# Patient Record
Sex: FEMALE | Race: WHITE | NOT HISPANIC OR LATINO | Employment: OTHER | URBAN - METROPOLITAN AREA
[De-identification: names, ages, dates, MRNs, and addresses within clinical notes are randomized per-mention and may not be internally consistent; named-entity substitution may affect disease eponyms.]

---

## 2020-01-09 ENCOUNTER — HOSPITAL ENCOUNTER (EMERGENCY)
Facility: OTHER | Age: 60
Discharge: HOME | End: 2020-01-09
Attending: EMERGENCY MEDICINE
Payer: MEDICAID

## 2020-01-09 VITALS
BODY MASS INDEX: 26.36 KG/M2 | RESPIRATION RATE: 20 BRPM | DIASTOLIC BLOOD PRESSURE: 58 MMHG | HEART RATE: 87 BPM | TEMPERATURE: 98 F | WEIGHT: 178 LBS | OXYGEN SATURATION: 97 % | SYSTOLIC BLOOD PRESSURE: 106 MMHG | HEIGHT: 69 IN

## 2020-01-09 DIAGNOSIS — R05.9 COUGH: ICD-10-CM

## 2020-01-09 DIAGNOSIS — J20.9 ACUTE BRONCHITIS, UNSPECIFIED ORGANISM: Primary | ICD-10-CM

## 2020-01-09 PROCEDURE — 94640 AIRWAY INHALATION TREATMENT: CPT

## 2020-01-09 PROCEDURE — 94761 N-INVAS EAR/PLS OXIMETRY MLT: CPT

## 2020-01-09 PROCEDURE — 99285 EMERGENCY DEPT VISIT HI MDM: CPT | Mod: 25

## 2020-01-09 PROCEDURE — 25000242 PHARM REV CODE 250 ALT 637 W/ HCPCS: Performed by: EMERGENCY MEDICINE

## 2020-01-09 RX ORDER — LEVOTHYROXINE SODIUM 112 UG/1
TABLET ORAL
COMMUNITY
Start: 2020-01-04

## 2020-01-09 RX ORDER — AZITHROMYCIN 250 MG/1
TABLET, FILM COATED ORAL
Qty: 6 TABLET | Refills: 0 | Status: SHIPPED | OUTPATIENT
Start: 2020-01-09

## 2020-01-09 RX ORDER — QUETIAPINE FUMARATE 25 MG/1
25 TABLET, FILM COATED ORAL
COMMUNITY

## 2020-01-09 RX ORDER — QUETIAPINE FUMARATE 300 MG/1
300 TABLET, FILM COATED ORAL
COMMUNITY

## 2020-01-09 RX ORDER — IPRATROPIUM BROMIDE AND ALBUTEROL SULFATE 2.5; .5 MG/3ML; MG/3ML
3 SOLUTION RESPIRATORY (INHALATION)
Status: COMPLETED | OUTPATIENT
Start: 2020-01-09 | End: 2020-01-09

## 2020-01-09 RX ORDER — FLUTICASONE PROPIONATE 50 MCG
SPRAY, SUSPENSION (ML) NASAL
COMMUNITY
Start: 2020-01-03

## 2020-01-09 RX ORDER — ALBUTEROL SULFATE 90 UG/1
AEROSOL, METERED RESPIRATORY (INHALATION)
COMMUNITY
Start: 2020-01-03

## 2020-01-09 RX ORDER — TOPIRAMATE 100 MG/1
TABLET, FILM COATED ORAL
COMMUNITY
Start: 2019-06-10

## 2020-01-09 RX ADMIN — IPRATROPIUM BROMIDE AND ALBUTEROL SULFATE 3 ML: .5; 3 SOLUTION RESPIRATORY (INHALATION) at 05:01

## 2020-01-09 NOTE — ED TRIAGE NOTES
"Pt presents to ED with c/o SOB x3 days. Reports she was given an inhaler at a recent Dr visit a week ago but she was unsure how to use the inhaler so she hasn't been using it. Reports having a productive cough with occasional bloody sputum. Sates her Dr has her on a "clear liquid and soft food diet" because "I have diarrhea after every time I eat". Denies fever, chills, dysuria, chest pain, vomiting, and palpations.   "

## 2020-01-09 NOTE — ED PROVIDER NOTES
"Encounter Date: 1/9/2020    SCRIBE #1 NOTE: I, Christine Calvert, am scribing for, and in the presence of, Dr. Watson.       History     Chief Complaint   Patient presents with    Shortness of Breath     pt came to the ed  tonight c.o. shortness of breath x 3 days. pt seen at PCP given meds, and not improving     Time seen by provider: 5:20 AM    This is a 59 y.o. female who presents via EMS with complaint of intermittent SOB for two weeks. She began having a cough that worsened 1.5 weeks ago, which she states is non productive but occasionally coughs up blood. She describes a rattling sensation in her chest with inspiration. She was not given treatment en route. She was seen by her PCP about 1.5 weeks ago and he prescribed her cough medicine and an albuterol inhaler, though she has not used it. She did not have CXR and did not start antibiotics. She denies fever, chills, chest pain, or myalgias. She has no PMHx of COPD or asthma. She does not smoke. She has not been hospitalized in the last three months. Her PCP placed her on a "clear and soft" diet due to her frequent vomitting and diarrhea when eating meals.    The history is provided by the patient. The history is limited by the condition of the patient.     Review of patient's allergies indicates:  No Known Allergies  Past Medical History:   Diagnosis Date    Seizures     Thyroid disease      History reviewed. No pertinent surgical history.  History reviewed. No pertinent family history.  Social History     Tobacco Use    Smoking status: Never Smoker   Substance Use Topics    Alcohol use: Not Currently     Frequency: Never    Drug use: Never     Review of Systems   Constitutional: Negative for chills and fever.   HENT: Negative for sore throat.    Eyes: Negative for visual disturbance.   Respiratory: Positive for cough and shortness of breath.         Positive for coughing up blood.   Cardiovascular: Negative for chest pain.   Gastrointestinal: Negative for " abdominal pain.   Genitourinary: Negative for dysuria.   Musculoskeletal: Negative for myalgias.   Skin: Negative for rash.   Neurological: Negative for weakness.       Physical Exam     Initial Vitals [01/09/20 0501]   BP Pulse Resp Temp SpO2   115/73 80 (!) 21 97.9 °F (36.6 °C) 96 %      MAP       --         Physical Exam    Nursing note and vitals reviewed.  Constitutional: She appears well-developed and well-nourished. She is not diaphoretic. No distress.   HENT:   Head: Normocephalic and atraumatic.   Eyes: EOM are normal.   Neck: Normal range of motion. Neck supple.   Cardiovascular: Normal rate, regular rhythm and normal heart sounds. Exam reveals no gallop and no friction rub.    No murmur heard.  Pulmonary/Chest: No respiratory distress. She has no wheezes. She has no rhonchi. She has no rales.   Coarse breath sounds bilaterally. Speaking full sentences.   Musculoskeletal: Normal range of motion. She exhibits no edema.   Neurological: She is alert and oriented to person, place, and time.   Skin: Skin is warm and dry.         ED Course   Procedures  Labs Reviewed - No data to display       Imaging Results          X-Ray Chest PA And Lateral (Final result)  Result time 01/09/20 06:45:55    Final result by Roshan Pettit MD (01/09/20 06:45:55)                 Impression:      Mild elevation of the left hemidiaphragm with suspected small amount of pleural fluid at the left lung base, this alternatively may relate to chronic pleural change, however there is no prior study available for comparison, clinical and historical correlation and follow-up is recommended.    The lungs otherwise demonstrate chronic change and mild atelectatic change.      Electronically signed by: Roshan Pettit  Date:    01/09/2020  Time:    06:45             Narrative:    EXAMINATION:  XR CHEST PA AND LATERAL    CLINICAL HISTORY:  Cough    TECHNIQUE:  PA and lateral views of the chest were  performed.    COMPARISON:  None    FINDINGS:  Two views of the chest are submitted.  The cardiomediastinal silhouette appears appropriate.  There is mild elevation of the left hemidiaphragm and there is blunting left costophrenic angle this may relate to small amount of pleural fluid.  There is mild atelectatic change at the lung bases bilaterally and there are chronic appearing pulmonary parenchymal findings noted, there is no evidence for superimposed confluent infiltrate or consolidation and no large pleural effusion or pneumothorax.  The osseous structures demonstrate chronic change including scoliotic curvature of the spine.                                 Medical Decision Making:   History:   Old Medical Records: I decided to obtain old medical records.  Clinical Tests:   Radiological Study: Ordered and Reviewed    Additional MDM:   Comments: 59-year-old nonsmoking female with no known pulmonary disease presents complaining of a cough that has been present for over 2 weeks.  She reports it is predominantly nonproductive with occasional clear sputum and blood tinged sputum.  She reports being seen by her primary care doctor over 1 week ago and prescribed an inhaler which she has not used because she was unsure of how to use it.  Vital signs on arrival significant for tachypnea with a respiratory rate of 21 and oxygen saturation of 96%.  However, on exam she appeared in no respiratory distress.  She did have intermittent coughing throughout my exam.  Lung exam significant for coarse breath sounds but no wheezing or crackles.  Presentation most consistent with acute bronchitis versus pneumonia.  Will obtain chest x-ray, give DuoNeb and reassess.      5:59 AM  Patient reports symptomatic improvement after nebs.  Chest x-ray pending..          Scribe Attestation:   Scribe #1: I performed the above scribed service and the documentation accurately describes the services I performed. I attest to the accuracy of the  note.    Attending Attestation:           Physician Attestation for Scribe:  Physician Attestation Statement for Scribe #1: I, Dr. Watson, reviewed documentation, as scribed by Christine Calvert in my presence, and it is both accurate and complete.                               Clinical Impression:     1. Acute bronchitis, unspecified organism    2. Cough                                Che Watson MD  01/09/20 [id number]

## 2024-02-08 ENCOUNTER — TELEPHONE (OUTPATIENT)
Dept: GASTROENTEROLOGY | Facility: CLINIC | Age: 64
End: 2024-02-08
Payer: MEDICAID

## 2024-02-08 NOTE — TELEPHONE ENCOUNTER
Called patient reference to a referral to  Ambulatory Colorectal Surgery for colon cancer screening,No answer.

## 2024-02-09 ENCOUNTER — TELEPHONE (OUTPATIENT)
Dept: GASTROENTEROLOGY | Facility: CLINIC | Age: 64
End: 2024-02-09
Payer: MEDICAID

## 2024-02-09 NOTE — TELEPHONE ENCOUNTER
Called patient reference to a referral to  Ambulatory Colorectal Surgery for colon cancer screening, no answer.

## 2024-02-27 ENCOUNTER — TELEPHONE (OUTPATIENT)
Dept: SURGERY | Facility: CLINIC | Age: 64
End: 2024-02-27
Payer: MEDICAID

## 2024-02-27 NOTE — TELEPHONE ENCOUNTER
Called patient reference to a referral to  Ambulatory Colorectal Surgery for colon cancer screening. No answer not able to leave a voice message.

## 2025-03-18 ENCOUNTER — HOSPITAL ENCOUNTER (INPATIENT)
Facility: OTHER | Age: 65
LOS: 18 days | DRG: 563 | End: 2025-04-07
Attending: EMERGENCY MEDICINE | Admitting: HOSPITALIST
Payer: COMMERCIAL

## 2025-03-18 DIAGNOSIS — S82.143A TIBIAL PLATEAU FRACTURE: ICD-10-CM

## 2025-03-18 DIAGNOSIS — M54.50 ACUTE BILATERAL LOW BACK PAIN WITHOUT SCIATICA: ICD-10-CM

## 2025-03-18 DIAGNOSIS — S82.832A CLOSED FRACTURE OF PROXIMAL END OF LEFT FIBULA, UNSPECIFIED FRACTURE MORPHOLOGY, INITIAL ENCOUNTER: ICD-10-CM

## 2025-03-18 DIAGNOSIS — E27.9 LESION OF ADRENAL GLAND: ICD-10-CM

## 2025-03-18 DIAGNOSIS — S80.12XA CONTUSION OF LEFT LOWER EXTREMITY, INITIAL ENCOUNTER: ICD-10-CM

## 2025-03-18 DIAGNOSIS — M25.562 LEFT KNEE PAIN: ICD-10-CM

## 2025-03-18 DIAGNOSIS — M25.462 EFFUSION OF LEFT KNEE: ICD-10-CM
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
## 2025-03-18 DIAGNOSIS — M25.552 LEFT HIP PAIN: ICD-10-CM

## 2025-03-18 DIAGNOSIS — S82.132A CLOSED FRACTURE OF MEDIAL PORTION OF LEFT TIBIAL PLATEAU, INITIAL ENCOUNTER: ICD-10-CM

## 2025-03-18 PROCEDURE — 99285 EMERGENCY DEPT VISIT HI MDM: CPT | Mod: 25

## 2025-03-18 PROCEDURE — 25000003 PHARM REV CODE 250: Performed by: NURSE PRACTITIONER

## 2025-03-18 RX ORDER — ACETAMINOPHEN 500 MG
1000 TABLET ORAL
Status: COMPLETED | OUTPATIENT
Start: 2025-03-18 | End: 2025-03-18

## 2025-03-18 RX ADMIN — ACETAMINOPHEN 1000 MG: 500 TABLET ORAL at 07:03
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
## 2025-03-19 PROBLEM — E03.9 ACQUIRED HYPOTHYROIDISM: Status: ACTIVE | Noted: 2025-03-19

## 2025-03-19 PROBLEM — S82.143A TIBIAL PLATEAU FRACTURE: Status: ACTIVE | Noted: 2025-03-19

## 2025-03-19 PROBLEM — R56.9 SEIZURE: Status: ACTIVE | Noted: 2025-03-19

## 2025-03-19 LAB
ALBUMIN SERPL BCP-MCNC: 3.8 G/DL (ref 3.5–5.2)
ALP SERPL-CCNC: 67 U/L (ref 40–150)
ALT SERPL W/O P-5'-P-CCNC: 25 U/L (ref 10–44)
ANION GAP SERPL CALC-SCNC: 5 MMOL/L (ref 8–16)
AST SERPL-CCNC: 25 U/L (ref 10–40)
BASOPHILS # BLD AUTO: 0.02 K/UL (ref 0–0.2)
BASOPHILS NFR BLD: 0.2 % (ref 0–1.9)
BILIRUB SERPL-MCNC: 0.6 MG/DL (ref 0.1–1)
BUN SERPL-MCNC: 12 MG/DL (ref 8–23)
CALCIUM SERPL-MCNC: 8.9 MG/DL (ref 8.7–10.5)
CHLORIDE SERPL-SCNC: 109 MMOL/L (ref 95–110)
CO2 SERPL-SCNC: 24 MMOL/L (ref 23–29)
CREAT SERPL-MCNC: 0.8 MG/DL (ref 0.5–1.4)
DIFFERENTIAL METHOD BLD: ABNORMAL
EOSINOPHIL # BLD AUTO: 0 K/UL (ref 0–0.5)
EOSINOPHIL NFR BLD: 0 % (ref 0–8)
ERYTHROCYTE [DISTWIDTH] IN BLOOD BY AUTOMATED COUNT: 13.9 % (ref 11.5–14.5)
EST. GFR  (NO RACE VARIABLE): >60 ML/MIN/1.73 M^2
ESTIMATED AVG GLUCOSE: 103 MG/DL (ref 68–131)
GLUCOSE SERPL-MCNC: 122 MG/DL (ref 70–110)
HBA1C MFR BLD: 5.2 % (ref 4–5.6)
HCT VFR BLD AUTO: 37.8 % (ref 37–48.5)
HGB BLD-MCNC: 12.5 G/DL (ref 12–16)
IMM GRANULOCYTES # BLD AUTO: 0.04 K/UL (ref 0–0.04)
IMM GRANULOCYTES NFR BLD AUTO: 0.3 % (ref 0–0.5)
LYMPHOCYTES # BLD AUTO: 0.8 K/UL (ref 1–4.8)
LYMPHOCYTES NFR BLD: 6.5 % (ref 18–48)
MAGNESIUM SERPL-MCNC: 2.1 MG/DL (ref 1.6–2.6)
MCH RBC QN AUTO: 30.2 PG (ref 27–31)
MCHC RBC AUTO-ENTMCNC: 33.1 G/DL (ref 32–36)
MCV RBC AUTO: 91 FL (ref 82–98)
MONOCYTES # BLD AUTO: 0.5 K/UL (ref 0.3–1)
MONOCYTES NFR BLD: 3.9 % (ref 4–15)
NEUTROPHILS # BLD AUTO: 10.3 K/UL (ref 1.8–7.7)
NEUTROPHILS NFR BLD: 89.1 % (ref 38–73)
NRBC BLD-RTO: 0 /100 WBC
PHOSPHATE SERPL-MCNC: 2.9 MG/DL (ref 2.7–4.5)
PLATELET # BLD AUTO: 214 K/UL (ref 150–450)
PMV BLD AUTO: 8.9 FL (ref 9.2–12.9)
POTASSIUM SERPL-SCNC: 4.2 MMOL/L (ref 3.5–5.1)
PROT SERPL-MCNC: 6.8 G/DL (ref 6–8.4)
RBC # BLD AUTO: 4.14 M/UL (ref 4–5.4)
SODIUM SERPL-SCNC: 138 MMOL/L (ref 136–145)
WBC # BLD AUTO: 11.56 K/UL (ref 3.9–12.7)

## 2025-03-19 PROCEDURE — 36415 COLL VENOUS BLD VENIPUNCTURE: CPT | Performed by: NURSE PRACTITIONER

## 2025-03-19 PROCEDURE — 25000003 PHARM REV CODE 250: Performed by: NURSE PRACTITIONER

## 2025-03-19 PROCEDURE — 83735 ASSAY OF MAGNESIUM: CPT | Performed by: NURSE PRACTITIONER

## 2025-03-19 PROCEDURE — 84100 ASSAY OF PHOSPHORUS: CPT | Performed by: NURSE PRACTITIONER

## 2025-03-19 PROCEDURE — 85025 COMPLETE CBC W/AUTO DIFF WBC: CPT | Performed by: NURSE PRACTITIONER

## 2025-03-19 PROCEDURE — 83036 HEMOGLOBIN GLYCOSYLATED A1C: CPT | Performed by: NURSE PRACTITIONER

## 2025-03-19 PROCEDURE — 63600175 PHARM REV CODE 636 W HCPCS: Performed by: NURSE PRACTITIONER

## 2025-03-19 PROCEDURE — 25000003 PHARM REV CODE 250: Performed by: EMERGENCY MEDICINE

## 2025-03-19 PROCEDURE — 80053 COMPREHEN METABOLIC PANEL: CPT | Performed by: NURSE PRACTITIONER

## 2025-03-19 PROCEDURE — G0378 HOSPITAL OBSERVATION PER HR: HCPCS

## 2025-03-19 RX ORDER — LEVOTHYROXINE SODIUM 112 UG/1
112 TABLET ORAL
Status: DISCONTINUED | OUTPATIENT
Start: 2025-03-19 | End: 2025-04-07 | Stop reason: HOSPADM

## 2025-03-19 RX ORDER — ATORVASTATIN CALCIUM 20 MG/1
40 TABLET, FILM COATED ORAL DAILY
Status: DISCONTINUED | OUTPATIENT
Start: 2025-03-19 | End: 2025-04-07 | Stop reason: HOSPADM

## 2025-03-19 RX ORDER — OXYCODONE HYDROCHLORIDE 5 MG/1
5 TABLET ORAL
Refills: 0 | Status: COMPLETED | OUTPATIENT
Start: 2025-03-19 | End: 2025-03-19

## 2025-03-19 RX ORDER — GLUCAGON 1 MG
1 KIT INJECTION
Status: DISCONTINUED | OUTPATIENT
Start: 2025-03-19 | End: 2025-04-07 | Stop reason: HOSPADM

## 2025-03-19 RX ORDER — SODIUM CHLORIDE, SODIUM LACTATE, POTASSIUM CHLORIDE, CALCIUM CHLORIDE 600; 310; 30; 20 MG/100ML; MG/100ML; MG/100ML; MG/100ML
INJECTION, SOLUTION INTRAVENOUS CONTINUOUS
Status: DISCONTINUED | OUTPATIENT
Start: 2025-03-19 | End: 2025-03-19

## 2025-03-19 RX ORDER — ENOXAPARIN SODIUM 100 MG/ML
40 INJECTION SUBCUTANEOUS EVERY 24 HOURS
Status: DISCONTINUED | OUTPATIENT
Start: 2025-03-20 | End: 2025-04-07 | Stop reason: HOSPADM

## 2025-03-19 RX ORDER — IBUPROFEN 200 MG
16 TABLET ORAL
Status: DISCONTINUED | OUTPATIENT
Start: 2025-03-19 | End: 2025-04-07 | Stop reason: HOSPADM

## 2025-03-19 RX ORDER — BENZONATATE 100 MG/1
100 CAPSULE ORAL ONCE
Status: COMPLETED | OUTPATIENT
Start: 2025-03-19 | End: 2025-03-19

## 2025-03-19 RX ORDER — POLYETHYLENE GLYCOL 3350 17 G/17G
17 POWDER, FOR SOLUTION ORAL DAILY
Status: DISCONTINUED | OUTPATIENT
Start: 2025-03-20 | End: 2025-04-07 | Stop reason: HOSPADM

## 2025-03-19 RX ORDER — SODIUM CHLORIDE 0.9 % (FLUSH) 0.9 %
10 SYRINGE (ML) INJECTION EVERY 8 HOURS PRN
Status: DISCONTINUED | OUTPATIENT
Start: 2025-03-19 | End: 2025-04-07 | Stop reason: HOSPADM

## 2025-03-19 RX ORDER — ONDANSETRON HYDROCHLORIDE 2 MG/ML
4 INJECTION, SOLUTION INTRAVENOUS EVERY 8 HOURS PRN
Status: DISCONTINUED | OUTPATIENT
Start: 2025-03-19 | End: 2025-04-06

## 2025-03-19 RX ORDER — ACETAMINOPHEN 325 MG/1
650 TABLET ORAL EVERY 4 HOURS PRN
Status: DISCONTINUED | OUTPATIENT
Start: 2025-03-19 | End: 2025-03-27

## 2025-03-19 RX ORDER — MORPHINE SULFATE 2 MG/ML
2 INJECTION, SOLUTION INTRAMUSCULAR; INTRAVENOUS EVERY 4 HOURS PRN
Refills: 0 | Status: DISCONTINUED | OUTPATIENT
Start: 2025-03-19 | End: 2025-03-20

## 2025-03-19 RX ORDER — NALOXONE HCL 0.4 MG/ML
0.02 VIAL (ML) INJECTION
Status: DISCONTINUED | OUTPATIENT
Start: 2025-03-19 | End: 2025-04-07 | Stop reason: HOSPADM

## 2025-03-19 RX ORDER — LAMOTRIGINE 100 MG/1
200 TABLET ORAL DAILY
Status: DISCONTINUED | OUTPATIENT
Start: 2025-03-19 | End: 2025-04-07 | Stop reason: HOSPADM

## 2025-03-19 RX ORDER — QUETIAPINE FUMARATE 200 MG/1
200 TABLET, FILM COATED ORAL NIGHTLY
COMMUNITY

## 2025-03-19 RX ORDER — ATORVASTATIN CALCIUM 40 MG/1
40 TABLET, FILM COATED ORAL NIGHTLY
Status: ON HOLD | COMMUNITY
End: 2025-04-02

## 2025-03-19 RX ORDER — IBUPROFEN 200 MG
24 TABLET ORAL
Status: DISCONTINUED | OUTPATIENT
Start: 2025-03-19 | End: 2025-04-07 | Stop reason: HOSPADM

## 2025-03-19 RX ADMIN — ACETAMINOPHEN 650 MG: 325 TABLET, FILM COATED ORAL at 05:03

## 2025-03-19 RX ADMIN — SODIUM CHLORIDE, POTASSIUM CHLORIDE, SODIUM LACTATE AND CALCIUM CHLORIDE: 600; 310; 30; 20 INJECTION, SOLUTION INTRAVENOUS at 05:03

## 2025-03-19 RX ADMIN — ACETAMINOPHEN 650 MG: 325 TABLET, FILM COATED ORAL at 01:03

## 2025-03-19 RX ADMIN — BENZONATATE 100 MG: 100 CAPSULE ORAL at 11:03

## 2025-03-19 RX ADMIN — MORPHINE SULFATE 2 MG: 2 INJECTION, SOLUTION INTRAMUSCULAR; INTRAVENOUS at 07:03

## 2025-03-19 RX ADMIN — OXYCODONE HYDROCHLORIDE 5 MG: 5 TABLET ORAL at 01:03

## 2025-03-19 RX ADMIN — LEVOTHYROXINE SODIUM 112 MCG: 112 TABLET ORAL at 05:03

## 2025-03-19 RX ADMIN — MORPHINE SULFATE 2 MG: 2 INJECTION, SOLUTION INTRAMUSCULAR; INTRAVENOUS at 09:03

## 2025-03-19 RX ADMIN — ACETAMINOPHEN 650 MG: 325 TABLET, FILM COATED ORAL at 11:03

## 2025-03-19 RX ADMIN — LAMOTRIGINE 200 MG: 100 TABLET ORAL at 08:03

## 2025-03-19 RX ADMIN — ATORVASTATIN CALCIUM 40 MG: 20 TABLET, FILM COATED ORAL at 08:03
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
## 2025-03-19 NOTE — PT/OT/SLP PROGRESS
Physical Therapy  Not Seen    Patient Name:  Anika Barajas   MRN:  [id number]    Patient not seen today secondary to Other (Comment), Bedrest (awaiting clarification on ROM restrictions from PA) and on bedrest until later in PM. Will follow-up tomorrow, 3/20.

## 2025-03-19 NOTE — ASSESSMENT & PLAN NOTE
CT- Acute depressed fracture of the medial tibial plateau. Subtle nondisplaced fracture of the proximal fibula.    Consult Ortho  Bedrest  Morphine for pain
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
## 2025-03-19 NOTE — PLAN OF CARE
Plan of care reviewed with patient. Pain managed with PRN pain medications. Patient expressing concerns with working with PT/OT. Patient sister Esther requesting case management call her tomorrow and speak with her at [phone] [facility] notified. Patient noted with a regular diet as Ortho reporting no surgical intervention needed at this time. Will note any changes as they occur.       Problem: Adult Inpatient Plan of Care  Goal: Plan of Care Review  Outcome: Progressing  Goal: Patient-Specific Goal (Individualized)  Outcome: Progressing  Goal: Absence of Hospital-Acquired Illness or Injury  Outcome: Progressing  Goal: Optimal Comfort and Wellbeing  Outcome: Progressing  Goal: Readiness for Transition of Care  Outcome: Progressing     Problem: Fall Injury Risk  Goal: Absence of Fall and Fall-Related Injury  Outcome: Progressing     Problem: Skin Injury Risk Increased  Goal: Skin Health and Integrity  Outcome: Progressing
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
## 2025-03-19 NOTE — SUBJECTIVE & OBJECTIVE
Past Medical History:   Diagnosis Date    Seizures     Thyroid disease        History reviewed. No pertinent surgical history.    Review of patient's allergies indicates:  No Known Allergies    No current facility-administered medications on file prior to encounter.     Current Outpatient Medications on File Prior to Encounter   Medication Sig    albuterol (PROVENTIL/VENTOLIN HFA) 90 mcg/actuation inhaler     azithromycin (Z-ALMA) 250 MG tablet Take first 2 tablets together, then 1 every day until finished.    fluticasone propionate (FLONASE) 50 mcg/actuation nasal spray     levothyroxine (SYNTHROID) 112 MCG tablet     QUEtiapine (SEROQUEL) 25 MG Tab Take 25 mg by mouth.    QUEtiapine (SEROQUEL) 300 MG Tab Take 300 mg by mouth.    topiramate (TOPAMAX) 100 MG tablet      Family History    None       Tobacco Use    Smoking status: Never    Smokeless tobacco: Not on file   Substance and Sexual Activity    Alcohol use: Not Currently    Drug use: Never    Sexual activity: Not on file     Review of Systems   Constitutional:  Negative for activity change, appetite change and fever.   HENT:  Negative for congestion, ear pain, rhinorrhea and sinus pressure.    Eyes:  Negative for pain and discharge.   Respiratory:  Negative for cough, chest tightness, shortness of breath and wheezing.    Cardiovascular:  Negative for chest pain and leg swelling.   Gastrointestinal:  Negative for abdominal distention, abdominal pain, diarrhea, nausea and vomiting.   Endocrine: Negative for cold intolerance and heat intolerance.   Genitourinary:  Negative for difficulty urinating, flank pain, frequency, hematuria and urgency.   Musculoskeletal:  Positive for arthralgias, joint swelling (left knee) and myalgias.   Allergic/Immunologic: Negative for environmental allergies and food allergies.   Neurological:  Negative for dizziness, weakness, light-headedness and headaches.   Hematological:  Does not bruise/bleed easily.   Psychiatric/Behavioral:   Negative for agitation, behavioral problems and decreased concentration.      Objective:     Vital Signs (Most Recent):  Temp: 97.1 °F (36.2 °C) (03/19/25 0511)  Pulse: 64 (03/19/25 0511)  Resp: 16 (03/19/25 0511)  BP: 130/75 (03/19/25 0511)  SpO2: (!) 93 % (03/19/25 0511) Vital Signs (24h Range):  Temp:  [97.1 °F (36.2 °C)-98.9 °F (37.2 °C)] 97.1 °F (36.2 °C)  Pulse:  [60-76] 64  Resp:  [16-20] 16  SpO2:  [93 %-100 %] 93 %  BP: (124-132)/(68-78) 130/75        There is no height or weight on file to calculate BMI.     Physical Exam  Constitutional:       Appearance: Normal appearance. She is well-developed.   HENT:      Head: Normocephalic.   Eyes:      General:         Right eye: No discharge.         Left eye: No discharge.      Conjunctiva/sclera: Conjunctivae normal.   Cardiovascular:      Rate and Rhythm: Normal rate and regular rhythm.      Heart sounds: Normal heart sounds.   Pulmonary:      Effort: Pulmonary effort is normal. No respiratory distress.      Breath sounds: Normal breath sounds.   Abdominal:      General: Bowel sounds are normal. There is no distension.      Palpations: Abdomen is soft.      Tenderness: There is no abdominal tenderness.   Musculoskeletal:      Cervical back: Normal range of motion and neck supple.      Left knee: Swelling present. Decreased range of motion. Tenderness present.   Skin:     General: Skin is warm and dry.   Neurological:      Mental Status: She is alert and oriented to person, place, and time.      GCS: GCS eye subscore is 4. GCS verbal subscore is 5. GCS motor subscore is 6.      Motor: Motor function is intact.   Psychiatric:         Mood and Affect: Mood normal.         Speech: Speech normal.         Behavior: Behavior normal.         Thought Content: Thought content normal.                Significant Labs: All pertinent labs within the past 24 hours have been reviewed.  CBC:   Recent Labs   Lab 03/19/25  0436   WBC 11.56   HGB 12.5   HCT 37.8         CMP:   Recent Labs   Lab 03/19/25  0436      K 4.2      CO2 24   *   BUN 12   CREATININE 0.8   CALCIUM 8.9   PROT 6.8   ALBUMIN 3.8   BILITOT 0.6   ALKPHOS 67   AST 25   ALT 25   ANIONGAP 5*       Significant Imaging: I have reviewed all pertinent imaging results/findings within the past 24 hours.
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
## 2025-03-19 NOTE — HOSPITAL COURSE
CT LEFT KNEE noted Acute depressed fracture of the medial tibial plateau; subtle nondisplaced fracture of the proximal fibula.  Orthopedics consulted for evaluation - recommends nonoperative treatment at this time with toe touch weight bearing and a hinged knee brace for at least 4 weeks, then repeat orthopedic evaluation. PT/OT rec SNF. CM aware. Delayed due to insurance issues.  Patient finally accepted to [facility] for a 30 day stay on 4/7/2025.  She was seen and examined on the day of discharge.

## 2025-03-19 NOTE — PLAN OF CARE
MOON Message    Medicare Outpatient and Observation Notification regarding financial responsibilityExplained to patient/caregiver; Signed/date by patient/caregiverDate GOMEZ - Patient gave Verbal Consent

## 2025-03-19 NOTE — PLAN OF CARE
Pt remained free of falls and injuries. AAOx4. Pt very anxious about being in the hospital but polite, talkative, and cooperative. Purposeful hourly rounding performed. Pt swallows meds whole. LR infusing @ 50 mL/hr. Managed c/o L knee pain with PRN Tylenol. Pt educated on bed rest/ NWB to LLE, verbalized understanding. LLE immobilizer maintained. Pure wick in place to suction. VSS on RA. Pt sitting up in bed watching tv, NADN. Bed low and locked, bed alarm on, call light in reach. Side rails up x3. Report given to KYLE Sherwood.
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
## 2025-03-19 NOTE — PLAN OF CARE
NONA/CM contacted the patient's sister, Esther, to discuss her concerns regarding her sisters discharge and the plan for care upon returning home. Esther expressed concerns about the patient living alone, if applicable, e.g., patients ability to manage care independently, need for home assistance.       [phone] -- Esther Curry

## 2025-03-19 NOTE — ASSESSMENT & PLAN NOTE
CT- Acute depressed fracture of the medial tibial plateau. Subtle nondisplaced fracture of the proximal fibula.    Consult Ortho  Bedrest  Morphine for pain  IVF
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
## 2025-03-19 NOTE — PROVIDER PROGRESS NOTES - EMERGENCY DEPT.
Encounter Date: 3/18/2025    ED Physician Progress Notes        Physician Note:   2:11 AM  I assumed care of this patient from Dr. Henry at 1:00 a.m..  At that time the patient was awaiting the results of her left knee CT.    CT is consistent with a depressed fracture of the medial tibial plateau as well as a subtle nondisplaced fracture of the proximal fibula.    I have discussed the case with Dr. Jj, orthopedics on-call who recommends knee immobilizer and nonweightbearing.    The patient will be admitted to the hospitalist service for pain control and orthopedic evaluation.

## 2025-03-20 LAB
ALBUMIN SERPL BCP-MCNC: 3.1 G/DL (ref 3.5–5.2)
ALP SERPL-CCNC: 62 U/L (ref 40–150)
ALT SERPL W/O P-5'-P-CCNC: 18 U/L (ref 10–44)
ANION GAP SERPL CALC-SCNC: 5 MMOL/L (ref 8–16)
AST SERPL-CCNC: 20 U/L (ref 10–40)
BASOPHILS # BLD AUTO: 0.02 K/UL (ref 0–0.2)
BASOPHILS NFR BLD: 0.2 % (ref 0–1.9)
BILIRUB SERPL-MCNC: 0.4 MG/DL (ref 0.1–1)
BUN SERPL-MCNC: 10 MG/DL (ref 8–23)
CALCIUM SERPL-MCNC: 8.1 MG/DL (ref 8.7–10.5)
CHLORIDE SERPL-SCNC: 107 MMOL/L (ref 95–110)
CO2 SERPL-SCNC: 23 MMOL/L (ref 23–29)
CREAT SERPL-MCNC: 0.7 MG/DL (ref 0.5–1.4)
DIFFERENTIAL METHOD BLD: ABNORMAL
EOSINOPHIL # BLD AUTO: 0 K/UL (ref 0–0.5)
EOSINOPHIL NFR BLD: 0 % (ref 0–8)
ERYTHROCYTE [DISTWIDTH] IN BLOOD BY AUTOMATED COUNT: 14 % (ref 11.5–14.5)
EST. GFR  (NO RACE VARIABLE): >60 ML/MIN/1.73 M^2
GLUCOSE SERPL-MCNC: 102 MG/DL (ref 70–110)
HCT VFR BLD AUTO: 34.6 % (ref 37–48.5)
HGB BLD-MCNC: 11.2 G/DL (ref 12–16)
IMM GRANULOCYTES # BLD AUTO: 0.04 K/UL (ref 0–0.04)
IMM GRANULOCYTES NFR BLD AUTO: 0.4 % (ref 0–0.5)
LYMPHOCYTES # BLD AUTO: 1.3 K/UL (ref 1–4.8)
LYMPHOCYTES NFR BLD: 13.9 % (ref 18–48)
MAGNESIUM SERPL-MCNC: 2.1 MG/DL (ref 1.6–2.6)
MCH RBC QN AUTO: 29.9 PG (ref 27–31)
MCHC RBC AUTO-ENTMCNC: 32.4 G/DL (ref 32–36)
MCV RBC AUTO: 92 FL (ref 82–98)
MONOCYTES # BLD AUTO: 0.6 K/UL (ref 0.3–1)
MONOCYTES NFR BLD: 6.1 % (ref 4–15)
NEUTROPHILS # BLD AUTO: 7.4 K/UL (ref 1.8–7.7)
NEUTROPHILS NFR BLD: 79.4 % (ref 38–73)
NRBC BLD-RTO: 0 /100 WBC
PHOSPHATE SERPL-MCNC: 2.7 MG/DL (ref 2.7–4.5)
PLATELET # BLD AUTO: 187 K/UL (ref 150–450)
PMV BLD AUTO: 10 FL (ref 9.2–12.9)
POTASSIUM SERPL-SCNC: 4.1 MMOL/L (ref 3.5–5.1)
PROT SERPL-MCNC: 6.3 G/DL (ref 6–8.4)
RBC # BLD AUTO: 3.75 M/UL (ref 4–5.4)
SODIUM SERPL-SCNC: 135 MMOL/L (ref 136–145)
WBC # BLD AUTO: 9.33 K/UL (ref 3.9–12.7)

## 2025-03-20 PROCEDURE — 11000001 HC ACUTE MED/SURG PRIVATE ROOM

## 2025-03-20 PROCEDURE — 80053 COMPREHEN METABOLIC PANEL: CPT | Performed by: NURSE PRACTITIONER

## 2025-03-20 PROCEDURE — 36415 COLL VENOUS BLD VENIPUNCTURE: CPT | Performed by: NURSE PRACTITIONER

## 2025-03-20 PROCEDURE — 63600175 PHARM REV CODE 636 W HCPCS: Performed by: NURSE PRACTITIONER

## 2025-03-20 PROCEDURE — 63600175 PHARM REV CODE 636 W HCPCS: Performed by: HOSPITALIST

## 2025-03-20 PROCEDURE — 25000003 PHARM REV CODE 250: Performed by: HOSPITALIST

## 2025-03-20 PROCEDURE — 83735 ASSAY OF MAGNESIUM: CPT | Performed by: NURSE PRACTITIONER

## 2025-03-20 PROCEDURE — 25000003 PHARM REV CODE 250: Performed by: NURSE PRACTITIONER

## 2025-03-20 PROCEDURE — 85025 COMPLETE CBC W/AUTO DIFF WBC: CPT | Performed by: NURSE PRACTITIONER

## 2025-03-20 PROCEDURE — 84100 ASSAY OF PHOSPHORUS: CPT | Performed by: NURSE PRACTITIONER

## 2025-03-20 RX ORDER — QUETIAPINE FUMARATE 25 MG/1
25 TABLET, FILM COATED ORAL DAILY
Status: DISCONTINUED | OUTPATIENT
Start: 2025-03-20 | End: 2025-04-07 | Stop reason: HOSPADM

## 2025-03-20 RX ORDER — OXYCODONE AND ACETAMINOPHEN 7.5; 325 MG/1; MG/1
1 TABLET ORAL EVERY 4 HOURS PRN
Refills: 0 | Status: DISCONTINUED | OUTPATIENT
Start: 2025-03-20 | End: 2025-03-27

## 2025-03-20 RX ORDER — OXYCODONE HYDROCHLORIDE 5 MG/1
5 TABLET ORAL EVERY 6 HOURS PRN
Refills: 0 | Status: DISCONTINUED | OUTPATIENT
Start: 2025-03-20 | End: 2025-03-20

## 2025-03-20 RX ORDER — AMOXICILLIN 250 MG
1 CAPSULE ORAL NIGHTLY
Status: COMPLETED | OUTPATIENT
Start: 2025-03-20 | End: 2025-03-20

## 2025-03-20 RX ORDER — OXYCODONE AND ACETAMINOPHEN 5; 325 MG/1; MG/1
1 TABLET ORAL EVERY 6 HOURS PRN
Refills: 0 | Status: DISCONTINUED | OUTPATIENT
Start: 2025-03-20 | End: 2025-03-27

## 2025-03-20 RX ORDER — TALC
6 POWDER (GRAM) TOPICAL NIGHTLY PRN
Status: DISCONTINUED | OUTPATIENT
Start: 2025-03-20 | End: 2025-04-07 | Stop reason: HOSPADM

## 2025-03-20 RX ORDER — QUETIAPINE FUMARATE 200 MG/1
200 TABLET, FILM COATED ORAL NIGHTLY
Status: DISCONTINUED | OUTPATIENT
Start: 2025-03-20 | End: 2025-04-07 | Stop reason: HOSPADM

## 2025-03-20 RX ORDER — AMOXICILLIN 250 MG
2 CAPSULE ORAL DAILY PRN
Status: DISCONTINUED | OUTPATIENT
Start: 2025-03-20 | End: 2025-03-27

## 2025-03-20 RX ADMIN — MORPHINE SULFATE 2 MG: 2 INJECTION, SOLUTION INTRAMUSCULAR; INTRAVENOUS at 09:03

## 2025-03-20 RX ADMIN — QUETIAPINE FUMARATE 25 MG: 25 TABLET ORAL at 08:03

## 2025-03-20 RX ADMIN — LAMOTRIGINE 200 MG: 100 TABLET ORAL at 08:03

## 2025-03-20 RX ADMIN — OXYCODONE HYDROCHLORIDE AND ACETAMINOPHEN 1 TABLET: 5; 325 TABLET ORAL at 08:03

## 2025-03-20 RX ADMIN — MORPHINE SULFATE 2 MG: 2 INJECTION, SOLUTION INTRAMUSCULAR; INTRAVENOUS at 03:03

## 2025-03-20 RX ADMIN — SENNOSIDES AND DOCUSATE SODIUM 1 TABLET: 50; 8.6 TABLET ORAL at 10:03

## 2025-03-20 RX ADMIN — ENOXAPARIN SODIUM 40 MG: 40 INJECTION SUBCUTANEOUS at 04:03

## 2025-03-20 RX ADMIN — QUETIAPINE FUMARATE 200 MG: 200 TABLET ORAL at 10:03

## 2025-03-20 RX ADMIN — POLYETHYLENE GLYCOL 3350 17 G: 17 POWDER, FOR SOLUTION ORAL at 08:03

## 2025-03-20 RX ADMIN — LEVOTHYROXINE SODIUM 112 MCG: 112 TABLET ORAL at 06:03

## 2025-03-20 RX ADMIN — ACETAMINOPHEN 650 MG: 325 TABLET, FILM COATED ORAL at 02:03

## 2025-03-20 RX ADMIN — ONDANSETRON 4 MG: 2 INJECTION INTRAMUSCULAR; INTRAVENOUS at 09:03

## 2025-03-20 RX ADMIN — ATORVASTATIN CALCIUM 40 MG: 20 TABLET, FILM COATED ORAL at 08:03

## 2025-03-20 RX ADMIN — MORPHINE SULFATE 2 MG: 2 INJECTION, SOLUTION INTRAMUSCULAR; INTRAVENOUS at 02:03

## 2025-03-20 NOTE — PT/OT/SLP PROGRESS
Physical Therapy  Not Seen    Patient Name:  Anika Barajas   MRN:  [id number]    Patient not seen today secondary to Therapist assessment (no hinged brace). Discussed with CM via secure chat, and the hinged knee brace will be delivered after rehab hours. Will follow-up tomorrow, 3/21.    Extended discussion with pt about rehab plans and gradual mobility - pt with increased anxious affect. At beside total of 30 min (13 min in AM, 17 min at PM). DF strength intact 5/5, deferred any mobility 2/2 lack of brace that was ordered by ortho PA Wednesday 3/19.      Living Environment:  Pt lives alone in an elevator accessible apartment.  Upon discharge, patient will have assistance from maybe family, but not available 24/7.  Prior level of function:  Ambulation: mod(I) with rollator, reports having used a rollator for all mobility for years  ADL's: mod(I)

## 2025-03-20 NOTE — PT/OT/SLP PROGRESS
Physical Therapy  Not Seen    Patient Name:  Anika Barajas   MRN:  [id number]    Patient not seen today secondary to Therapist assessment, Patient ill (Comment) (vomiting after IV morphine, hinged knee brace not at bedside). Had initiated PT evaluation but upon minimal adjustment of bed pt with onset of nausea with vomiting and discovered incorrect brace at that time. Will follow-up when brace delivered.

## 2025-03-20 NOTE — PLAN OF CARE
Pt remained free of falls and injuries. AAOx4. Pt very anxious and tearful at times, cooperative. Purposeful hourly rounding performed. Pt swallows meds whole. IV flushed and saline locked. Managed c/o L knee pain with PRN Tylenol. LLE immobilizer maintained. Pure wick in place to suction. Frequent weight shifting encouraged, weight shift assistance provided. VSS on RA. Pt resting in bed, denies needs at this time, NADN. Bed low and locked, bed alarm on, call light in reach. Side rails up x3. Will continue to monitor.
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
## 2025-03-20 NOTE — SUBJECTIVE & OBJECTIVE
Interval History:  States has pain to fracture site.        Review of Systems   Constitutional:  Negative for appetite change and fever.   Respiratory:  Negative for cough and shortness of breath.    Cardiovascular:  Negative for chest pain and palpitations.   Gastrointestinal:  Negative for abdominal pain and nausea.   Musculoskeletal:  Positive for gait problem.   Skin:  Negative for color change and rash.   Neurological:  Negative for headaches.   Psychiatric/Behavioral:  Negative for agitation.      Objective:     Vital Signs (Most Recent):  Temp: 98 °F (36.7 °C) (03/20/25 1517)  Pulse: 60 (03/20/25 1517)  Resp: 17 (03/20/25 1517)  BP: 112/67 (03/20/25 1517)  SpO2: (!) 92 % (03/20/25 1517) Vital Signs (24h Range):  Temp:  [97.8 °F (36.6 °C)-98.6 °F (37 °C)] 98 °F (36.7 °C)  Pulse:  [56-70] 60  Resp:  [12-18] 17  SpO2:  [90 %-94 %] 92 %  BP: ()/(51-67) 112/67     Weight: 84.6 kg (186 lb 8.2 oz)  Body mass index is 27.54 kg/m².    Intake/Output Summary (Last 24 hours) at 3/20/2025 1623  Last data filed at 3/20/2025 0611  Gross per 24 hour   Intake 1352.65 ml   Output 850 ml   Net 502.65 ml         Physical Exam  Constitutional:       General: She is not in acute distress.     Appearance: Normal appearance. She is well-developed.   HENT:      Head: Normocephalic and atraumatic.   Eyes:      Conjunctiva/sclera: Conjunctivae normal.      Pupils: Pupils are equal, round, and reactive to light.   Cardiovascular:      Rate and Rhythm: Normal rate and regular rhythm.   Pulmonary:      Effort: Pulmonary effort is normal.      Breath sounds: Normal breath sounds.   Abdominal:      General: Bowel sounds are normal.      Palpations: Abdomen is soft.      Tenderness: There is no abdominal tenderness.   Musculoskeletal:      Cervical back: Normal range of motion.      Comments: Brace to LLE   Skin:     General: Skin is warm and dry.   Neurological:      Mental Status: She is alert and oriented to person, place, and time.  "            Significant Labs: All pertinent labs within the past 24 hours have been reviewed.  CBC:   Recent Labs   Lab 03/19/25 0436 03/20/25  0253   WBC 11.56 9.33   HGB 12.5 11.2*   HCT 37.8 34.6*    187     CMP:   Recent Labs   Lab 03/19/25 0436 03/20/25  0253    135*   K 4.2 4.1    107   CO2 24 23   * 102   BUN 12 10   CREATININE 0.8 0.7   CALCIUM 8.9 8.1*   PROT 6.8 6.3   ALBUMIN 3.8 3.1*   BILITOT 0.6 0.4   ALKPHOS 67 62   AST 25 20   ALT 25 18   ANIONGAP 5* 5*     Coagulation: No results for input(s): "PT", "INR", "APTT" in the last 48 hours.  Magnesium:   Recent Labs   Lab 03/19/25 0436 03/20/25 0253   MG 2.1 2.1       Significant Imaging: I have reviewed all pertinent imaging results/findings within the past 24 hours.    Imaging Results               CT Knee Without Contrast Left (Final result)  Result time 03/19/25 01:54:39      Final result by Roshan Pettit MD (03/19/25 01:54:39)                   Impression:      Acute depressed fracture of the medial tibial plateau.    Subtle nondisplaced fracture of the proximal fibula.    Additional findings as above.    This report was flagged in Epic as abnormal.      Electronically signed by: Roshan Pettit  Date:    03/19/2025  Time:    01:54               Narrative:    EXAMINATION:  CT KNEE WITHOUT CONTRAST LEFT    CLINICAL HISTORY:  Knee trauma, tibial plateau fracture (Age >= 5y);Knee trauma, occult fracture suspected, xray done;    TECHNIQUE:  CT examination of the left knee was performed, axial imaging, sagittal and coronal reconstruction imaging is submitted.    COMPARISON:  Radiograph left knee March 18, 2025    FINDINGS:  There is acute fracture deformity involving the proximal left tibia involving the medial tibia plateau, there is mildly comminuted depressed fracture deformity of the medial tibial plateau.    There is also subtle nondisplaced fracture involving the proximal left fibula.    Small osseous density " adjacent to the medial aspect of the patella appears chronic and may relate to remote injury.  There is no evidence for patellofemoral or femoral tibial dislocation.    There is appearance consistent with hemarthrosis.    The osseous structures otherwise demonstrate chronic change.  There is no CT detectable radiopaque foreign body.                                       X-Ray Hip 2 or 3 views Left with Pelvis when performed (Final result)  Result time 03/18/25 23:59:15      Final result by David Rivas MD (03/18/25 23:59:15)                   Impression:      No acute process.      Electronically signed by: David Rivas MD  Date:    03/18/2025  Time:    23:59               Narrative:    EXAMINATION:  XR HIP WITH PELVIS WHEN PERFORMED 2 OR 3 VIEWS LEFT    CLINICAL HISTORY:  left hip pain;    TECHNIQUE:  AP view of the pelvis and frog leg lateral view of the left hip were performed.    COMPARISON:  None    FINDINGS:  The bone mineralization is within normal limits.  There is no cortical step-off.  There is no evidence of periostitis.    There is joint space narrowing with minimal osteophytosis.  The soft tissues are unremarkable.  No radiopaque foreign body is identified.    There is no evidence of a fracture or dislocation.                                       X-Ray Knee 1 or 2 View Left (Final result)  Result time 03/18/25 23:57:21   Procedure changed from X-Ray Knee 3 View Left     Final result by David Rivas MD (03/18/25 23:57:21)                   Impression:      As above.      Electronically signed by: David Rivas MD  Date:    03/18/2025  Time:    23:57               Narrative:    EXAMINATION:  XR KNEE 1 OR 2 VIEW LEFT    CLINICAL HISTORY:  left knee pain; Pain in left knee    TECHNIQUE:  One or two views of the left knee were performed.    COMPARISON:  None    FINDINGS:  Examination is somewhat limited due to suboptimal positioning along with multiple overlying artifacts.    The bone mineralization is  limits.  There is no cortical step-off.  There is no evidence of periostitis.    There is tricompartmental joint space narrowing with osteophytosis.  There are no erosive changes.    There is no definitive acute fracture or dislocation.                                       CT Lumbar Spine Without Contrast (Final result)  Result time 03/18/25 22:54:20      Final result by Roshan Pettit MD (03/18/25 22:54:20)                   Impression:      Multilevel chronic change noted, correlation for any specific level of symptomatology is needed.    There is no evidence for acute fracture deformity of the lumbar spine.    Right adrenal lesion, as discussed above, short-term follow-up is recommended.      Electronically signed by: Roshan Pettit  Date:    03/18/2025  Time:    22:54               Narrative:    EXAMINATION:  CT LUMBAR SPINE WITHOUT CONTRAST    CLINICAL HISTORY:  Low back pain, trauma;    TECHNIQUE:  Low-dose axial, sagittal and coronal reformations are obtained through the lumbar spine.  Contrast was not administered.    COMPARISON:  None.    FINDINGS:  There is mild grade 1 retrolisthesis of L2 with respect L3 and L3 with respect L4.  There is no high-grade spondylolisthesis.  Mild chronic endplate change noted, there is no evidence for high-grade or acute compression fracture deformity.  Facet arthropathy is noted, there is no evidence for facet dislocation or facet fracture deformity.  On coronal imaging there is curvature of the lumbar spine convex to the left.    The T11-12 and T12-L1 levels demonstrate no evidence for high-grade spinal canal or foraminal stenosis.    The L1-2 level demonstrates mild chronic change, there is no high-grade spinal canal or foraminal stenosis.    The L2-3 level demonstrates the aforementioned spondylolisthesis, there is mild degenerative disc bulge.  There is posterior facet arthropathy and ligamentous thickening.  There is mild spinal canal stenosis.  There is  foraminal narrowing, no evidence for exiting nerve root impingement.    The L3-4 level demonstrates the aforementioned spondylolisthesis.  There is mild diffuse degenerative disc bulge.  There is posterior facet arthropathy with ligamentous thickening.  There is mild spinal canal stenosis.  There is foraminal narrowing without evidence for exiting nerve root impingement.    The L4-5 level demonstrates diffuse degenerative disc bulge.  There is posterior facet arthropathy and ligamentous thickening.  These findings combine to produce the appearance of moderate spinal canal stenosis.  There is foraminal encroachment, right greater than left, without obvious exiting nerve root impingement however correlation for exiting right L4 nerve root symptomatology is needed.    The L5-S1 level demonstrates diffuse degenerative disc bulge with anterior impression upon the dural sac.  There is posterior facet arthropathy ligamentous thickening.  There is mild-to-moderate spinal canal stenosis.  There is foraminal narrowing without obvious exiting nerve root impingement.    There are chronic changes at the sacroiliac joints noted.    On close evaluation of available imaging there is no evidence for acute lumbar spine fracture.    There is a mass lesion of the right adrenal gland noted measuring up to approximately 4 cm on axial imaging.  This is a soft tissue mass lesion, measuring predominantly in the range of 21-25 Hounsfield units, there does appear to be a component of macroscopic fat, a small area measuring -31 Hounsfield units.  This may relate to an adrenal myelolipoma however given the small amount of macroscopic fat follow-up is recommended.                                       CT Cervical Spine Without Contrast (Final result)  Result time 03/18/25 22:21:14      Final result by David Rivas MD (03/18/25 22:21:14)                   Impression:      No evidence of acute fracture or traumatic malalignment of the cervical  spine.      Electronically signed by: David Rivas MD  Date:    03/18/2025  Time:    22:21               Narrative:    EXAMINATION:  CT CERVICAL SPINE WITHOUT CONTRAST    CLINICAL HISTORY:  Neck trauma (Age >= 65y);    TECHNIQUE:  Low dose axial images, sagittal and coronal reformations were performed though the cervical spine.  Contrast was not administered.    COMPARISON:  CT scan of the cervical spine dated 02/26/2006.    FINDINGS:  There are mild motion limitations to the examination.    The craniocervical junction is intact.  The predental space is maintained.  No prevertebral soft tissue swelling is identified.    The cervical alignment is maintained.  The vertebral body heights are maintained.  The occipital condyles are unremarkable the C1 ring is intact.  There is mild hypertrophy of the posterior elements.    There is intervertebral disc space at multiple levels.  There is variable degree of spinal canal and neural foraminal narrowing.    There is no evidence of acute fracture or traumatic malalignment of the cervical spine.    The soft tissue the neck are within normal limits.  The visualized lung apices are unremarkable.                                       CT Head Without Contrast (Final result)  Result time 03/18/25 22:17:00      Final result by David Rivas MD (03/18/25 22:17:00)                   Impression:      No acute intracranial process.    Right maxillary sinus disease.      Electronically signed by: David Rivas MD  Date:    03/18/2025  Time:    22:17               Narrative:    EXAMINATION:  CT HEAD WITHOUT CONTRAST    CLINICAL HISTORY:  Head trauma, moderate-severe;    TECHNIQUE:  Low dose axial images were obtained through the head.  Coronal and sagittal reformations were also performed. Contrast was not administered.    COMPARISON:  CT head dated 02/26/2006.    FINDINGS:  The subcutaneous tissue unremarkable.  There is hyperostosis frontalis.  There is mucosal thickening within the  right maxillary sinus.  There are secretions within the right maxillary sinus.  The remainder of the paranasal are unremarkable.  The mastoid air cells are clear.  The orbits and intraorbital contents are unremarkable.    The craniocervical junction is intact.  The sellar and parasellar structures are unremarkable.  There are no extra-axial fluid collections.  There is no evidence of intracranial hemorrhage.    The ventricles and sulci are within normal limits.  The cisterns are unremarkable.  The gray-white differentiation is maintained.  There is no dense vessel sign.  There is no evidence of mass effect.

## 2025-03-20 NOTE — PLAN OF CARE
POC reviewed with the patient. AOX4 VSS on RA. All due medications given as per MAR. Complained of knee pain, PRN medication given. Purposeful rounding done. Safety precautions maintained.     Problem: Adult Inpatient Plan of Care  Goal: Plan of Care Review  Outcome: Progressing  Goal: Patient-Specific Goal (Individualized)  Outcome: Progressing  Goal: Absence of Hospital-Acquired Illness or Injury  Outcome: Progressing  Goal: Optimal Comfort and Wellbeing  Outcome: Progressing  Goal: Readiness for Transition of Care  Outcome: Progressing

## 2025-03-21 LAB
ALBUMIN SERPL BCP-MCNC: 2.9 G/DL (ref 3.5–5.2)
ALP SERPL-CCNC: 59 U/L (ref 40–150)
ALT SERPL W/O P-5'-P-CCNC: 15 U/L (ref 10–44)
ANION GAP SERPL CALC-SCNC: 5 MMOL/L (ref 8–16)
AST SERPL-CCNC: 15 U/L (ref 10–40)
BASOPHILS # BLD AUTO: 0.03 K/UL (ref 0–0.2)
BASOPHILS NFR BLD: 0.4 % (ref 0–1.9)
BILIRUB SERPL-MCNC: 0.3 MG/DL (ref 0.1–1)
BUN SERPL-MCNC: 20 MG/DL (ref 8–23)
CALCIUM SERPL-MCNC: 8.1 MG/DL (ref 8.7–10.5)
CHLORIDE SERPL-SCNC: 105 MMOL/L (ref 95–110)
CO2 SERPL-SCNC: 24 MMOL/L (ref 23–29)
CREAT SERPL-MCNC: 0.8 MG/DL (ref 0.5–1.4)
DIFFERENTIAL METHOD BLD: ABNORMAL
EOSINOPHIL # BLD AUTO: 0 K/UL (ref 0–0.5)
EOSINOPHIL NFR BLD: 0 % (ref 0–8)
ERYTHROCYTE [DISTWIDTH] IN BLOOD BY AUTOMATED COUNT: 14 % (ref 11.5–14.5)
EST. GFR  (NO RACE VARIABLE): >60 ML/MIN/1.73 M^2
GLUCOSE SERPL-MCNC: 92 MG/DL (ref 70–110)
HCT VFR BLD AUTO: 32.6 % (ref 37–48.5)
HGB BLD-MCNC: 10.2 G/DL (ref 12–16)
IMM GRANULOCYTES # BLD AUTO: 0.02 K/UL (ref 0–0.04)
IMM GRANULOCYTES NFR BLD AUTO: 0.3 % (ref 0–0.5)
LYMPHOCYTES # BLD AUTO: 2.1 K/UL (ref 1–4.8)
LYMPHOCYTES NFR BLD: 29 % (ref 18–48)
MAGNESIUM SERPL-MCNC: 2 MG/DL (ref 1.6–2.6)
MCH RBC QN AUTO: 28.9 PG (ref 27–31)
MCHC RBC AUTO-ENTMCNC: 31.3 G/DL (ref 32–36)
MCV RBC AUTO: 92 FL (ref 82–98)
MONOCYTES # BLD AUTO: 0.7 K/UL (ref 0.3–1)
MONOCYTES NFR BLD: 9.3 % (ref 4–15)
NEUTROPHILS # BLD AUTO: 4.5 K/UL (ref 1.8–7.7)
NEUTROPHILS NFR BLD: 61 % (ref 38–73)
NRBC BLD-RTO: 0 /100 WBC
PHOSPHATE SERPL-MCNC: 3.3 MG/DL (ref 2.7–4.5)
PLATELET # BLD AUTO: 157 K/UL (ref 150–450)
PMV BLD AUTO: 9.6 FL (ref 9.2–12.9)
POTASSIUM SERPL-SCNC: 4.2 MMOL/L (ref 3.5–5.1)
PROT SERPL-MCNC: 5.8 G/DL (ref 6–8.4)
RBC # BLD AUTO: 3.53 M/UL (ref 4–5.4)
SODIUM SERPL-SCNC: 134 MMOL/L (ref 136–145)
WBC # BLD AUTO: 7.35 K/UL (ref 3.9–12.7)

## 2025-03-21 PROCEDURE — 27000221 HC OXYGEN, UP TO 24 HOURS

## 2025-03-21 PROCEDURE — 25000003 PHARM REV CODE 250: Performed by: HOSPITALIST

## 2025-03-21 PROCEDURE — 25000003 PHARM REV CODE 250: Performed by: NURSE PRACTITIONER

## 2025-03-21 PROCEDURE — 63600175 PHARM REV CODE 636 W HCPCS: Performed by: HOSPITALIST

## 2025-03-21 PROCEDURE — 84100 ASSAY OF PHOSPHORUS: CPT | Performed by: NURSE PRACTITIONER

## 2025-03-21 PROCEDURE — 80053 COMPREHEN METABOLIC PANEL: CPT | Performed by: NURSE PRACTITIONER

## 2025-03-21 PROCEDURE — 94761 N-INVAS EAR/PLS OXIMETRY MLT: CPT

## 2025-03-21 PROCEDURE — 11000001 HC ACUTE MED/SURG PRIVATE ROOM

## 2025-03-21 PROCEDURE — 36415 COLL VENOUS BLD VENIPUNCTURE: CPT | Performed by: NURSE PRACTITIONER

## 2025-03-21 PROCEDURE — 83735 ASSAY OF MAGNESIUM: CPT | Performed by: NURSE PRACTITIONER

## 2025-03-21 PROCEDURE — 97166 OT EVAL MOD COMPLEX 45 MIN: CPT

## 2025-03-21 PROCEDURE — 97162 PT EVAL MOD COMPLEX 30 MIN: CPT

## 2025-03-21 PROCEDURE — 97530 THERAPEUTIC ACTIVITIES: CPT

## 2025-03-21 PROCEDURE — 97535 SELF CARE MNGMENT TRAINING: CPT

## 2025-03-21 PROCEDURE — 85025 COMPLETE CBC W/AUTO DIFF WBC: CPT | Performed by: NURSE PRACTITIONER

## 2025-03-21 RX ADMIN — LAMOTRIGINE 200 MG: 100 TABLET ORAL at 08:03

## 2025-03-21 RX ADMIN — POLYETHYLENE GLYCOL 3350 17 G: 17 POWDER, FOR SOLUTION ORAL at 08:03

## 2025-03-21 RX ADMIN — QUETIAPINE FUMARATE 25 MG: 25 TABLET ORAL at 08:03

## 2025-03-21 RX ADMIN — ENOXAPARIN SODIUM 40 MG: 40 INJECTION SUBCUTANEOUS at 04:03

## 2025-03-21 RX ADMIN — ATORVASTATIN CALCIUM 40 MG: 20 TABLET, FILM COATED ORAL at 08:03

## 2025-03-21 RX ADMIN — MELATONIN TAB 3 MG 6 MG: 3 TAB at 08:03

## 2025-03-21 RX ADMIN — OXYCODONE HYDROCHLORIDE AND ACETAMINOPHEN 1 TABLET: 5; 325 TABLET ORAL at 06:03

## 2025-03-21 RX ADMIN — LEVOTHYROXINE SODIUM 112 MCG: 112 TABLET ORAL at 06:03

## 2025-03-21 RX ADMIN — QUETIAPINE FUMARATE 200 MG: 200 TABLET ORAL at 08:03

## 2025-03-21 RX ADMIN — OXYCODONE HYDROCHLORIDE AND ACETAMINOPHEN 1 TABLET: 5; 325 TABLET ORAL at 08:03

## 2025-03-21 NOTE — PLAN OF CARE
Pt remained free of falls and injuries. AAOx4. Pt calm and cooperative. Purposeful hourly rounding performed. Pt swallows meds whole. IV flushed and saline locked. Managed c/o L knee pain with PRN Percocet. LLE hinge brace maintained. Pure wick in place to suction. Frequent weight shifting encouraged, weight shift assistance provided. O2 sats were low this AM, 86% when pt would fall back asleep, 88/89% briefly went awaking pt to deep breathe. Pt placed on 1 L O2 NC with 93% O2 sats. Other VSS. Pt sleeping in bed, even rise and fall of chest. Bed low and locked, bed alarm on, call light in reach. Side rails up x3. Will continue to monitor.
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
## 2025-03-21 NOTE — PT/OT/SLP EVAL
"Physical Therapy Evaluation    Patient Name:  Anika Barajas   MRN:  [id number]    Recommendations:     Discharge Recommendations: Moderate Intensity Therapy   Discharge Equipment Recommendations: other (see comments) (TBD by next level of care)   Barriers to discharge: Inaccessible home and Decreased caregiver support    Assessment:     Anika Barajas is a 64 y.o. female admitted with a medical diagnosis of Tibial plateau fracture.  She presents with the following impairments/functional limitations: weakness, impaired endurance, impaired functional mobility, gait instability, impaired balance, decreased lower extremity function, pain, decreased ROM, orthopedic precautions.    Pt was able to tolerate bed mobility, transfer training, and standing in RW with orthopedic precautions maintained and lower extremity in brace locked in extension position. She presents today below her stated functional baseline and so would benefit from skilled PT services while in acute care to address the above deficits, reduce weakness 2/2 reduced mobility, restore PLOF and improve long-term functional outcomes. Moderate Intensity Therapy recommended post-acute care. Equipment recs are TBD by next level of care.    Rehab Prognosis: Good; patient would benefit from acute skilled PT services to address these deficits and reach maximum level of function.    Recent Surgery: * No surgery found *      Plan:     During this hospitalization, patient to be seen 3 x/week to address the identified rehab impairments via gait training, therapeutic activities, therapeutic exercises, neuromuscular re-education and progress toward the following goals:    Plan of Care Expires:  04/18/25    Subjective     Chief Complaint: pain and inability to ambulate  Patient/Family Comments/goals: "get this leg feeling right again"  Pain/Comfort:  Pain Rating 1: 0/10    Patients cultural, spiritual, Alevism conflicts given the current situation: " no    Living Environment:  Lives alone in elevator-accessible apartment. Ambulates using ankle braces and rollator.  Prior to admission, patients level of function was mod independent.  Equipment used at home: bath bench, shower chair, rollator.  DME owned (not currently used): none.  Upon discharge, patient will have minimal assistance from family nearby.    Objective:     Communicated with RN prior to session.  Patient found supine with oxygen, peripheral IV, PureWick  upon PT entry to room.    General Precautions: Standard, fall  Orthopedic Precautions:LLE toe touch weight bearing   Braces: Knee immobilizer  Respiratory Status: Nasal cannula, flow 1 L/min    Exams:  Cognition:   Patient is oriented x4.  Pt follows approximately 100% of single step commands.    Mood: Pleasant and cooperative.   Musculoskeletal:  Posture:    -       Rounded shoulders  -       Kyphosis  LE ROM/Strength: left knee range of motion and strength impacted by tibial fracture and orthopedic precautions/restrictions    Left  Muscle group Right   3/5 Hip flexion 5/5   3/5 Knee extension 5/5   5/5 Ankle dorsiflexion 5/5     Neuromuscular:  Sensation: Intact to light touch bilateral LEs.   Coordination: DNA formally  Tone/Reflexes: No impairments identified with functional mobility. No formal testing performed.   Balance: normal seated balance without need for upper extremity support. Standing balance impaired with heavy reliance on upper extremity support on RW, tremors noted  Visual-vestibular: No impairments identified with functional mobility. No formal testing performed.  Integument: Bruising of left knee joint  Cardiopulmonary:  Edema: mild around left knee      Functional Mobility:  Bed Mobility:     Scooting: minimum assistance and of 2 persons  Supine to Sit: stand by assistance  Sit to Supine: stand by assistance  Transfers:     Sit to Stand:  moderate assistance and of 1 persons with rolling walker and gait belt, left lower  extremity precautions maintained and brace locked in extension position      AM-PAC 6 CLICK MOBILITY  Total Score:13       Treatment & Education:  Therapeutic Activities:  Pt stood in RW for 2 bouts, 1 minute and 3 minutes, to challenge cardiopulmonary system after bedrest and provide training for transfers while maintaining safety and orthopedic precautions  Gait belt used for safety    Patient left supine with all lines intact, call button in reach, and RN present.    GOALS:   Multidisciplinary Problems       Physical Therapy Goals          Problem: Physical Therapy    Goal Priority Disciplines Outcome Interventions   Physical Therapy Goal     PT, PT/OT Progressing    Description: 1. [place] with Bed Mobility  2. Modified [place] with Transfers using LRAD  3. Tolerate OOB x 2 hours   4. Gait  x 10 feet with maintaining weight-bearing precaution(s) using Rolling Walker.                          DME Justifications:  No DME recommended requiring DME justifications    History:     Past Medical History:   Diagnosis Date    Seizures     Thyroid disease        History reviewed. No pertinent surgical history.    Time Tracking:     PT Received On: 03/21/25  PT Start Time: 0912     PT Stop Time: 0932  PT Total Time (min): 20 min     Billable Minutes: Evaluation 11 and Therapeutic Activity 9      03/21/2025

## 2025-03-21 NOTE — SUBJECTIVE & OBJECTIVE
Interval History:  States is working with PT.  She is having her diet but is upset because they did not bring her dessert.      Review of Systems   Constitutional:  Negative for appetite change and fever.   Respiratory:  Negative for cough and shortness of breath.    Cardiovascular:  Negative for chest pain and palpitations.   Gastrointestinal:  Negative for abdominal pain and nausea.   Musculoskeletal:  Positive for gait problem.   Skin:  Negative for color change and rash.   Neurological:  Negative for headaches.   Psychiatric/Behavioral:  Negative for agitation.      Objective:     Vital Signs (Most Recent):  Temp: 97.8 °F (36.6 °C) (03/21/25 0705)  Pulse: (!) 52 (03/21/25 0705)  Resp: 12 (03/21/25 0813)  BP: 120/65 (03/21/25 0705)  SpO2: 97 % (03/21/25 0705) Vital Signs (24h Range):  Temp:  [97.8 °F (36.6 °C)-98.6 °F (37 °C)] 97.8 °F (36.6 °C)  Pulse:  [52-63] 52  Resp:  [12-18] 12  SpO2:  [86 %-97 %] 97 %  BP: ()/(51-68) 120/65     Weight: 84.6 kg (186 lb 8.2 oz)  Body mass index is 27.54 kg/m².    Intake/Output Summary (Last 24 hours) at 3/21/2025 1010  Last data filed at 3/21/2025 0817  Gross per 24 hour   Intake 410 ml   Output 2900 ml   Net -2490 ml         Physical Exam  Constitutional:       General: She is not in acute distress.     Appearance: Normal appearance. She is well-developed.   HENT:      Head: Normocephalic and atraumatic.   Eyes:      Conjunctiva/sclera: Conjunctivae normal.      Pupils: Pupils are equal, round, and reactive to light.   Cardiovascular:      Rate and Rhythm: Normal rate and regular rhythm.   Pulmonary:      Effort: Pulmonary effort is normal.      Breath sounds: Normal breath sounds.   Abdominal:      General: Bowel sounds are normal.      Palpations: Abdomen is soft.      Tenderness: There is no abdominal tenderness.   Musculoskeletal:      Cervical back: Normal range of motion.      Comments: Brace to LLE   Skin:     General: Skin is warm and dry.   Neurological:       "Mental Status: She is alert and oriented to person, place, and time.             Significant Labs: All pertinent labs within the past 24 hours have been reviewed.  CBC:   Recent Labs   Lab 03/20/25 0253 03/21/25 0208   WBC 9.33 7.35   HGB 11.2* 10.2*   HCT 34.6* 32.6*    157     CMP:   Recent Labs   Lab 03/20/25 0253 03/21/25 0208   * 134*   K 4.1 4.2    105   CO2 23 24    92   BUN 10 20   CREATININE 0.7 0.8   CALCIUM 8.1* 8.1*   PROT 6.3 5.8*   ALBUMIN 3.1* 2.9*   BILITOT 0.4 0.3   ALKPHOS 62 59   AST 20 15   ALT 18 15   ANIONGAP 5* 5*     Coagulation: No results for input(s): "PT", "INR", "APTT" in the last 48 hours.  Magnesium:   Recent Labs   Lab 03/20/25 0253 03/21/25 0208   MG 2.1 2.0       Significant Imaging: I have reviewed all pertinent imaging results/findings within the past 24 hours.    Imaging Results               CT Knee Without Contrast Left (Final result)  Result time 03/19/25 01:54:39      Final result by Roshan Pettit MD (03/19/25 01:54:39)                   Impression:      Acute depressed fracture of the medial tibial plateau.    Subtle nondisplaced fracture of the proximal fibula.    Additional findings as above.    This report was flagged in Epic as abnormal.      Electronically signed by: Roshan Pettit  Date:    03/19/2025  Time:    01:54               Narrative:    EXAMINATION:  CT KNEE WITHOUT CONTRAST LEFT    CLINICAL HISTORY:  Knee trauma, tibial plateau fracture (Age >= 5y);Knee trauma, occult fracture suspected, xray done;    TECHNIQUE:  CT examination of the left knee was performed, axial imaging, sagittal and coronal reconstruction imaging is submitted.    COMPARISON:  Radiograph left knee March 18, 2025    FINDINGS:  There is acute fracture deformity involving the proximal left tibia involving the medial tibia plateau, there is mildly comminuted depressed fracture deformity of the medial tibial plateau.    There is also subtle nondisplaced " fracture involving the proximal left fibula.    Small osseous density adjacent to the medial aspect of the patella appears chronic and may relate to remote injury.  There is no evidence for patellofemoral or femoral tibial dislocation.    There is appearance consistent with hemarthrosis.    The osseous structures otherwise demonstrate chronic change.  There is no CT detectable radiopaque foreign body.                                       X-Ray Hip 2 or 3 views Left with Pelvis when performed (Final result)  Result time 03/18/25 23:59:15      Final result by David Rivas MD (03/18/25 23:59:15)                   Impression:      No acute process.      Electronically signed by: David Rivas MD  Date:    03/18/2025  Time:    23:59               Narrative:    EXAMINATION:  XR HIP WITH PELVIS WHEN PERFORMED 2 OR 3 VIEWS LEFT    CLINICAL HISTORY:  left hip pain;    TECHNIQUE:  AP view of the pelvis and frog leg lateral view of the left hip were performed.    COMPARISON:  None    FINDINGS:  The bone mineralization is within normal limits.  There is no cortical step-off.  There is no evidence of periostitis.    There is joint space narrowing with minimal osteophytosis.  The soft tissues are unremarkable.  No radiopaque foreign body is identified.    There is no evidence of a fracture or dislocation.                                       X-Ray Knee 1 or 2 View Left (Final result)  Result time 03/18/25 23:57:21   Procedure changed from X-Ray Knee 3 View Left     Final result by David Rivas MD (03/18/25 23:57:21)                   Impression:      As above.      Electronically signed by: David Rivas MD  Date:    03/18/2025  Time:    23:57               Narrative:    EXAMINATION:  XR KNEE 1 OR 2 VIEW LEFT    CLINICAL HISTORY:  left knee pain; Pain in left knee    TECHNIQUE:  One or two views of the left knee were performed.    COMPARISON:  None    FINDINGS:  Examination is somewhat limited due to suboptimal positioning  along with multiple overlying artifacts.    The bone mineralization is limits.  There is no cortical step-off.  There is no evidence of periostitis.    There is tricompartmental joint space narrowing with osteophytosis.  There are no erosive changes.    There is no definitive acute fracture or dislocation.                                       CT Lumbar Spine Without Contrast (Final result)  Result time 03/18/25 22:54:20      Final result by Roshan Pettit MD (03/18/25 22:54:20)                   Impression:      Multilevel chronic change noted, correlation for any specific level of symptomatology is needed.    There is no evidence for acute fracture deformity of the lumbar spine.    Right adrenal lesion, as discussed above, short-term follow-up is recommended.      Electronically signed by: Roshan Pettit  Date:    03/18/2025  Time:    22:54               Narrative:    EXAMINATION:  CT LUMBAR SPINE WITHOUT CONTRAST    CLINICAL HISTORY:  Low back pain, trauma;    TECHNIQUE:  Low-dose axial, sagittal and coronal reformations are obtained through the lumbar spine.  Contrast was not administered.    COMPARISON:  None.    FINDINGS:  There is mild grade 1 retrolisthesis of L2 with respect L3 and L3 with respect L4.  There is no high-grade spondylolisthesis.  Mild chronic endplate change noted, there is no evidence for high-grade or acute compression fracture deformity.  Facet arthropathy is noted, there is no evidence for facet dislocation or facet fracture deformity.  On coronal imaging there is curvature of the lumbar spine convex to the left.    The T11-12 and T12-L1 levels demonstrate no evidence for high-grade spinal canal or foraminal stenosis.    The L1-2 level demonstrates mild chronic change, there is no high-grade spinal canal or foraminal stenosis.    The L2-3 level demonstrates the aforementioned spondylolisthesis, there is mild degenerative disc bulge.  There is posterior facet arthropathy and  ligamentous thickening.  There is mild spinal canal stenosis.  There is foraminal narrowing, no evidence for exiting nerve root impingement.    The L3-4 level demonstrates the aforementioned spondylolisthesis.  There is mild diffuse degenerative disc bulge.  There is posterior facet arthropathy with ligamentous thickening.  There is mild spinal canal stenosis.  There is foraminal narrowing without evidence for exiting nerve root impingement.    The L4-5 level demonstrates diffuse degenerative disc bulge.  There is posterior facet arthropathy and ligamentous thickening.  These findings combine to produce the appearance of moderate spinal canal stenosis.  There is foraminal encroachment, right greater than left, without obvious exiting nerve root impingement however correlation for exiting right L4 nerve root symptomatology is needed.    The L5-S1 level demonstrates diffuse degenerative disc bulge with anterior impression upon the dural sac.  There is posterior facet arthropathy ligamentous thickening.  There is mild-to-moderate spinal canal stenosis.  There is foraminal narrowing without obvious exiting nerve root impingement.    There are chronic changes at the sacroiliac joints noted.    On close evaluation of available imaging there is no evidence for acute lumbar spine fracture.    There is a mass lesion of the right adrenal gland noted measuring up to approximately 4 cm on axial imaging.  This is a soft tissue mass lesion, measuring predominantly in the range of 21-25 Hounsfield units, there does appear to be a component of macroscopic fat, a small area measuring -31 Hounsfield units.  This may relate to an adrenal myelolipoma however given the small amount of macroscopic fat follow-up is recommended.                                       CT Cervical Spine Without Contrast (Final result)  Result time 03/18/25 22:21:14      Final result by David Rivas MD (03/18/25 22:21:14)                   Impression:      No  evidence of acute fracture or traumatic malalignment of the cervical spine.      Electronically signed by: David Rivas MD  Date:    03/18/2025  Time:    22:21               Narrative:    EXAMINATION:  CT CERVICAL SPINE WITHOUT CONTRAST    CLINICAL HISTORY:  Neck trauma (Age >= 65y);    TECHNIQUE:  Low dose axial images, sagittal and coronal reformations were performed though the cervical spine.  Contrast was not administered.    COMPARISON:  CT scan of the cervical spine dated 02/26/2006.    FINDINGS:  There are mild motion limitations to the examination.    The craniocervical junction is intact.  The predental space is maintained.  No prevertebral soft tissue swelling is identified.    The cervical alignment is maintained.  The vertebral body heights are maintained.  The occipital condyles are unremarkable the C1 ring is intact.  There is mild hypertrophy of the posterior elements.    There is intervertebral disc space at multiple levels.  There is variable degree of spinal canal and neural foraminal narrowing.    There is no evidence of acute fracture or traumatic malalignment of the cervical spine.    The soft tissue the neck are within normal limits.  The visualized lung apices are unremarkable.                                       CT Head Without Contrast (Final result)  Result time 03/18/25 22:17:00      Final result by David Rivas MD (03/18/25 22:17:00)                   Impression:      No acute intracranial process.    Right maxillary sinus disease.      Electronically signed by: David Rivas MD  Date:    03/18/2025  Time:    22:17               Narrative:    EXAMINATION:  CT HEAD WITHOUT CONTRAST    CLINICAL HISTORY:  Head trauma, moderate-severe;    TECHNIQUE:  Low dose axial images were obtained through the head.  Coronal and sagittal reformations were also performed. Contrast was not administered.    COMPARISON:  CT head dated 02/26/2006.    FINDINGS:  The subcutaneous tissue unremarkable.  There is  hyperostosis frontalis.  There is mucosal thickening within the right maxillary sinus.  There are secretions within the right maxillary sinus.  The remainder of the paranasal are unremarkable.  The mastoid air cells are clear.  The orbits and intraorbital contents are unremarkable.    The craniocervical junction is intact.  The sellar and parasellar structures are unremarkable.  There are no extra-axial fluid collections.  There is no evidence of intracranial hemorrhage.    The ventricles and sulci are within normal limits.  The cisterns are unremarkable.  The gray-white differentiation is maintained.  There is no dense vessel sign.  There is no evidence of mass effect.

## 2025-03-21 NOTE — PT/OT/SLP EVAL
Occupational Therapy   Evaluation    Name: Anika Barajas  MRN: [id number]  Admitting Diagnosis: Tibial plateau fracture  Recent Surgery: * No surgery found *      Recommendations:     Discharge Recommendations: Moderate Intensity Therapy  Discharge Equipment Recommendations:  bedside commode  Barriers to discharge:  Decreased caregiver support    Assessment:     Anika Barajas is a 64 y.o. female with a medical diagnosis of Tibial plateau fracture.  She presents with the following performance deficits affecting function: weakness, impaired endurance, impaired self care skills, impaired functional mobility, gait instability, impaired balance, decreased upper extremity function, decreased lower extremity function, decreased safety awareness, pain, decreased ROM, orthopedic precautions.      Rehab Prognosis: Good; patient would benefit from acute skilled OT services to address these deficits and reach maximum level of function.       Plan:     Patient to be seen 5 x/week to address the above listed problems via self-care/home management, therapeutic activities, therapeutic exercises  Plan of Care Expires: 04/04/25  Plan of Care Reviewed with: patient    Subjective     Chief Complaint: Left lower extremity pain  Patient/Family Comments/goals: to decrease pain and return to PLOF    Occupational Profile:  Living Environment: Pt lives alone in an elevator accessible apartment.  Previous level of function: Mod I with rollator, reports having used a rollator for all mobility for years Independent; Mod I with ADL's  Roles and Routines: Mother  Equipment Used at Home: bath bench, shower chair, rollator  Assistance upon Discharge: Upon discharge, patient will have assistance from maybe family, but not available 24/7.    Pain/Comfort:  Pain Rating 1:  (Pt reporting pain in Left LE unable to give a number)  Location - Side 1: Left  Location - Orientation 1: lower  Location 1: knee  Pain Addressed 1: Pre-medicate for  activity, Nurse notified    Patients cultural, spiritual, Restoration conflicts given the current situation: no    Objective:     Communicated with: Nursing prior to session.  Patient found HOB elevated with oxygen, peripheral IV, PureWick upon OT entry to room.    General Precautions: Standard,    Orthopedic Precautions: LLE toe touch weight bearing  Braces: Knee immobilizer  Respiratory Status: Nasal cannula, flow 1 L/min    Occupational Performance:    Bed Mobility:    Patient completed Rolling/Turning to Left with  supervision  Patient completed Sit to Supine with supervision    Functional Mobility/Transfers:  Patient completed Sit <> Stand Transfer with moderate assistance  with  rolling walker  x 2 for OT/PT on either side x 2 trials standing ~2 minutes each trial    Activities of Daily Living:  Upper Body Dressing: supervision to radha/doff gown  Lower Body Dressing: maximal assistance to radha/doff socks and underwear  Toileting: maximal assistance patient found in bed with purewick which must have shifted. Patient soiled     Cognitive/Visual Perceptual:  Cognitive/Psychosocial Skills:     -       Oriented to: Person, Place, and Situation   -       Follows Commands/attention:Easily distracted and Follows one-step commands  -       Communication: clear/fluent  -       Memory: No Deficits noted  -       Safety awareness/insight to disability: impaired   -       Mood/Affect/Coping skills/emotional control: Anxious    Physical Exam:  Upper Extremity Range of Motion:     -       Right Upper Extremity: WNL  -       Left Upper Extremity: WNL  Upper Extremity Strength:    -       Right Upper Extremity: WFL  -       Left Upper Extremity: WFL    AMPAC 6 Click ADL:  AMPAC Total Score:      Treatment & Education:  On Role of OT and POC    Patient left supine with all lines intact, call button in reach, and Nursing present    GOALS:   Multidisciplinary Problems       Occupational Therapy Goals          Problem: Occupational  Therapy    Goal Priority Disciplines Outcome Interventions   Occupational Therapy Goal     OT, PT/OT Progressing    Description: Goals to be met by: 4/4/2025     Patient will increase functional independence with ADLs by performing:    UE Dressing with Supervision.  LE Dressing with Minimal Assistance.  Grooming while EOB with Set-up Assistance.  Toilet transfer to bedside commode with Contact Guard Assistance.                         DME Justifications:   Anika Bolton requires a commode for home use because she is confined to a single room.    History:     Past Medical History:   Diagnosis Date    Seizures     Thyroid disease        History reviewed. No pertinent surgical history.    Time Tracking:     OT Date of Treatment: 03/21/25  OT Start Time: 0910  OT Stop Time: 0935  OT Total Time (min): 25 min    Billable Minutes:Evaluation 15  Self Care/Home Management 10    3/21/2025

## 2025-03-21 NOTE — PLAN OF CARE
Pt was able to tolerate bed mobility, transfer training, and standing in RW with orthopedic precautions maintained and lower extremity in brace locked in extension position. She presents today below her stated functional baseline and so would benefit from skilled PT services while in acute care to address the above deficits, reduce weakness 2/2 reduced mobility, restore PLOF and improve long-term functional outcomes. Moderate Intensity Therapy recommended post-acute care. Equipment recs are TBD by next level of care.    Problem: Physical Therapy  Goal: Physical Therapy Goal  Description: 1. Frio with Bed Mobility  2. Modified Frio with Transfers using LRAD  3. Tolerate OOB x 2 hours   4. Gait  x 10 feet with maintaining weight-bearing precaution(s) using Rolling Walker.     Outcome: Progressing

## 2025-03-21 NOTE — PLAN OF CARE
03/21/25 1614   Post-Acute Status   Post-Acute Authorization Placement   Post-Acute Placement Status Referrals Sent   Patient choice form signed by patient/caregiver List with quality metrics by geographic area provided;List from CMS Compare;List from System Post-Acute Care   Discharge Delays None known at this time   Discharge Plan   Discharge Plan A Skilled Nursing Facility   Discharge Plan B Home;Home Health     Met with patient to review discharge recommendation of SNF and is agreeable to plan    Patient/family provided list of facilities in-network with patient's payor plan. Providers that are owned, operated, or affiliated with [facility] are included on the list.     Notified that referral sent to below listed facilities from in-network list based on proximity to home/family support:     All SNFs in the area.     Patient/family instructed to identify preference.    Preferred Facility: (if more than 1, listed in order of descending preference)  [facility] (close to patient home)    If an additional preferred facility not listed above is identified, additional referral to be sent. If above facilities unable to accept, will send additional referrals to in-network providers.     Patient is aware that insurance does not cover SNF and may be an issue with finding placement.

## 2025-03-21 NOTE — ASSESSMENT & PLAN NOTE
Continue Lamictal     Dr Jack Gómez. Patient wasw under the impression that if his ultrasound came back ok that he would not need to see you. US was completed in Feb 2020. (last visit note, did mention discharge if us was good)    Did you still want to follow up with labs and see him Friday in [facility] ([place] Please advise.

## 2025-03-21 NOTE — PLAN OF CARE
Problem: Occupational Therapy  Goal: Occupational Therapy Goal  Description: Goals to be met by: 4/4/2025     Patient will increase functional independence with ADLs by performing:    UE Dressing with Supervision.  LE Dressing with Minimal Assistance.  Grooming while EOB with Set-up Assistance.  Toilet transfer to bedside commode with Contact Guard Assistance.    Outcome: Progressing

## 2025-03-21 NOTE — ASSESSMENT & PLAN NOTE
CT- Acute depressed fracture of the medial tibial plateau. Subtle nondisplaced fracture of the proximal fibula.    Consult Ortho  Percocet p.r.n. for pain    Ortho recs:   Order hinge knee brace and consult PT. Non-operative treatment at this time. Continue with current pain regimen.

## 2025-03-22 PROCEDURE — 25000003 PHARM REV CODE 250: Performed by: NURSE PRACTITIONER

## 2025-03-22 PROCEDURE — 11000001 HC ACUTE MED/SURG PRIVATE ROOM

## 2025-03-22 PROCEDURE — 25000003 PHARM REV CODE 250: Performed by: HOSPITALIST

## 2025-03-22 PROCEDURE — 63600175 PHARM REV CODE 636 W HCPCS: Performed by: HOSPITALIST

## 2025-03-22 RX ADMIN — LEVOTHYROXINE SODIUM 112 MCG: 112 TABLET ORAL at 06:03

## 2025-03-22 RX ADMIN — LAMOTRIGINE 200 MG: 100 TABLET ORAL at 09:03

## 2025-03-22 RX ADMIN — POLYETHYLENE GLYCOL 3350 17 G: 17 POWDER, FOR SOLUTION ORAL at 09:03

## 2025-03-22 RX ADMIN — QUETIAPINE FUMARATE 200 MG: 200 TABLET ORAL at 10:03

## 2025-03-22 RX ADMIN — SENNOSIDES AND DOCUSATE SODIUM 2 TABLET: 50; 8.6 TABLET ORAL at 10:03

## 2025-03-22 RX ADMIN — OXYCODONE HYDROCHLORIDE AND ACETAMINOPHEN 1 TABLET: 5; 325 TABLET ORAL at 09:03

## 2025-03-22 RX ADMIN — ENOXAPARIN SODIUM 40 MG: 40 INJECTION SUBCUTANEOUS at 06:03

## 2025-03-22 RX ADMIN — ACETAMINOPHEN 650 MG: 325 TABLET, FILM COATED ORAL at 06:03

## 2025-03-22 RX ADMIN — ATORVASTATIN CALCIUM 40 MG: 20 TABLET, FILM COATED ORAL at 09:03

## 2025-03-22 RX ADMIN — QUETIAPINE FUMARATE 25 MG: 25 TABLET ORAL at 09:03
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
## 2025-03-22 NOTE — SUBJECTIVE & OBJECTIVE
Interval History:  Doing okay.  States she is working with PT and open to going to a SNF.    Review of Systems   Constitutional:  Negative for appetite change and fever.   Respiratory:  Negative for cough and shortness of breath.    Cardiovascular:  Negative for chest pain and palpitations.   Gastrointestinal:  Negative for abdominal pain and nausea.   Musculoskeletal:  Positive for gait problem.   Skin:  Negative for color change and rash.   Neurological:  Negative for headaches.   Psychiatric/Behavioral:  Negative for agitation.      Objective:     Vital Signs (Most Recent):  Temp: 98.4 °F (36.9 °C) (03/22/25 1200)  Pulse: (!) 58 (03/22/25 1200)  Resp: 18 (03/22/25 1200)  BP: 108/60 (03/22/25 1200)  SpO2: 96 % (03/22/25 1200) Vital Signs (24h Range):  Temp:  [97.7 °F (36.5 °C)-98.4 °F (36.9 °C)] 98.4 °F (36.9 °C)  Pulse:  [50-61] 58  Resp:  [12-18] 18  SpO2:  [93 %-98 %] 96 %  BP: ()/(47-66) 108/60     Weight: 84.6 kg (186 lb 8.2 oz)  Body mass index is 27.54 kg/m².    Intake/Output Summary (Last 24 hours) at 3/22/2025 1337  Last data filed at 3/22/2025 0841  Gross per 24 hour   Intake --   Output 1200 ml   Net -1200 ml         Physical Exam  Constitutional:       General: She is not in acute distress.     Appearance: Normal appearance. She is well-developed.   HENT:      Head: Normocephalic and atraumatic.   Eyes:      Conjunctiva/sclera: Conjunctivae normal.      Pupils: Pupils are equal, round, and reactive to light.   Cardiovascular:      Rate and Rhythm: Normal rate and regular rhythm.   Pulmonary:      Effort: Pulmonary effort is normal.      Breath sounds: Normal breath sounds.   Abdominal:      General: Bowel sounds are normal.      Palpations: Abdomen is soft.      Tenderness: There is no abdominal tenderness.   Musculoskeletal:      Cervical back: Normal range of motion.      Comments: Brace to LLE   Skin:     General: Skin is warm and dry.   Neurological:      Mental Status: She is alert and  "oriented to person, place, and time.             Significant Labs: All pertinent labs within the past 24 hours have been reviewed.  CBC:   Recent Labs   Lab 03/21/25  0208   WBC 7.35   HGB 10.2*   HCT 32.6*        CMP:   Recent Labs   Lab 03/21/25  0208   *   K 4.2      CO2 24   GLU 92   BUN 20   CREATININE 0.8   CALCIUM 8.1*   PROT 5.8*   ALBUMIN 2.9*   BILITOT 0.3   ALKPHOS 59   AST 15   ALT 15   ANIONGAP 5*     Coagulation: No results for input(s): "PT", "INR", "APTT" in the last 48 hours.  Magnesium:   Recent Labs   Lab 03/21/25  0208   MG 2.0       Significant Imaging: I have reviewed all pertinent imaging results/findings within the past 24 hours.    Imaging Results               CT Knee Without Contrast Left (Final result)  Result time 03/19/25 01:54:39      Final result by Roshan Pettit MD (03/19/25 01:54:39)                   Impression:      Acute depressed fracture of the medial tibial plateau.    Subtle nondisplaced fracture of the proximal fibula.    Additional findings as above.    This report was flagged in Epic as abnormal.      Electronically signed by: Roshan Pettit  Date:    03/19/2025  Time:    01:54               Narrative:    EXAMINATION:  CT KNEE WITHOUT CONTRAST LEFT    CLINICAL HISTORY:  Knee trauma, tibial plateau fracture (Age >= 5y);Knee trauma, occult fracture suspected, xray done;    TECHNIQUE:  CT examination of the left knee was performed, axial imaging, sagittal and coronal reconstruction imaging is submitted.    COMPARISON:  Radiograph left knee March 18, 2025    FINDINGS:  There is acute fracture deformity involving the proximal left tibia involving the medial tibia plateau, there is mildly comminuted depressed fracture deformity of the medial tibial plateau.    There is also subtle nondisplaced fracture involving the proximal left fibula.    Small osseous density adjacent to the medial aspect of the patella appears chronic and may relate to remote injury. "  There is no evidence for patellofemoral or femoral tibial dislocation.    There is appearance consistent with hemarthrosis.    The osseous structures otherwise demonstrate chronic change.  There is no CT detectable radiopaque foreign body.                                       X-Ray Hip 2 or 3 views Left with Pelvis when performed (Final result)  Result time 03/18/25 23:59:15      Final result by David Rivas MD (03/18/25 23:59:15)                   Impression:      No acute process.      Electronically signed by: David Rivas MD  Date:    03/18/2025  Time:    23:59               Narrative:    EXAMINATION:  XR HIP WITH PELVIS WHEN PERFORMED 2 OR 3 VIEWS LEFT    CLINICAL HISTORY:  left hip pain;    TECHNIQUE:  AP view of the pelvis and frog leg lateral view of the left hip were performed.    COMPARISON:  None    FINDINGS:  The bone mineralization is within normal limits.  There is no cortical step-off.  There is no evidence of periostitis.    There is joint space narrowing with minimal osteophytosis.  The soft tissues are unremarkable.  No radiopaque foreign body is identified.    There is no evidence of a fracture or dislocation.                                       X-Ray Knee 1 or 2 View Left (Final result)  Result time 03/18/25 23:57:21   Procedure changed from X-Ray Knee 3 View Left     Final result by David Rivas MD (03/18/25 23:57:21)                   Impression:      As above.      Electronically signed by: David Rivas MD  Date:    03/18/2025  Time:    23:57               Narrative:    EXAMINATION:  XR KNEE 1 OR 2 VIEW LEFT    CLINICAL HISTORY:  left knee pain; Pain in left knee    TECHNIQUE:  One or two views of the left knee were performed.    COMPARISON:  None    FINDINGS:  Examination is somewhat limited due to suboptimal positioning along with multiple overlying artifacts.    The bone mineralization is limits.  There is no cortical step-off.  There is no evidence of periostitis.    There is  tricompartmental joint space narrowing with osteophytosis.  There are no erosive changes.    There is no definitive acute fracture or dislocation.                                       CT Lumbar Spine Without Contrast (Final result)  Result time 03/18/25 22:54:20      Final result by Roshan Pettit MD (03/18/25 22:54:20)                   Impression:      Multilevel chronic change noted, correlation for any specific level of symptomatology is needed.    There is no evidence for acute fracture deformity of the lumbar spine.    Right adrenal lesion, as discussed above, short-term follow-up is recommended.      Electronically signed by: Roshan Pettit  Date:    03/18/2025  Time:    22:54               Narrative:    EXAMINATION:  CT LUMBAR SPINE WITHOUT CONTRAST    CLINICAL HISTORY:  Low back pain, trauma;    TECHNIQUE:  Low-dose axial, sagittal and coronal reformations are obtained through the lumbar spine.  Contrast was not administered.    COMPARISON:  None.    FINDINGS:  There is mild grade 1 retrolisthesis of L2 with respect L3 and L3 with respect L4.  There is no high-grade spondylolisthesis.  Mild chronic endplate change noted, there is no evidence for high-grade or acute compression fracture deformity.  Facet arthropathy is noted, there is no evidence for facet dislocation or facet fracture deformity.  On coronal imaging there is curvature of the lumbar spine convex to the left.    The T11-12 and T12-L1 levels demonstrate no evidence for high-grade spinal canal or foraminal stenosis.    The L1-2 level demonstrates mild chronic change, there is no high-grade spinal canal or foraminal stenosis.    The L2-3 level demonstrates the aforementioned spondylolisthesis, there is mild degenerative disc bulge.  There is posterior facet arthropathy and ligamentous thickening.  There is mild spinal canal stenosis.  There is foraminal narrowing, no evidence for exiting nerve root impingement.    The L3-4 level  demonstrates the aforementioned spondylolisthesis.  There is mild diffuse degenerative disc bulge.  There is posterior facet arthropathy with ligamentous thickening.  There is mild spinal canal stenosis.  There is foraminal narrowing without evidence for exiting nerve root impingement.    The L4-5 level demonstrates diffuse degenerative disc bulge.  There is posterior facet arthropathy and ligamentous thickening.  These findings combine to produce the appearance of moderate spinal canal stenosis.  There is foraminal encroachment, right greater than left, without obvious exiting nerve root impingement however correlation for exiting right L4 nerve root symptomatology is needed.    The L5-S1 level demonstrates diffuse degenerative disc bulge with anterior impression upon the dural sac.  There is posterior facet arthropathy ligamentous thickening.  There is mild-to-moderate spinal canal stenosis.  There is foraminal narrowing without obvious exiting nerve root impingement.    There are chronic changes at the sacroiliac joints noted.    On close evaluation of available imaging there is no evidence for acute lumbar spine fracture.    There is a mass lesion of the right adrenal gland noted measuring up to approximately 4 cm on axial imaging.  This is a soft tissue mass lesion, measuring predominantly in the range of 21-25 Hounsfield units, there does appear to be a component of macroscopic fat, a small area measuring -31 Hounsfield units.  This may relate to an adrenal myelolipoma however given the small amount of macroscopic fat follow-up is recommended.                                       CT Cervical Spine Without Contrast (Final result)  Result time 03/18/25 22:21:14      Final result by David Rivas MD (03/18/25 22:21:14)                   Impression:      No evidence of acute fracture or traumatic malalignment of the cervical spine.      Electronically signed by: David Rivas  MD  Date:    03/18/2025  Time:    22:21               Narrative:    EXAMINATION:  CT CERVICAL SPINE WITHOUT CONTRAST    CLINICAL HISTORY:  Neck trauma (Age >= 65y);    TECHNIQUE:  Low dose axial images, sagittal and coronal reformations were performed though the cervical spine.  Contrast was not administered.    COMPARISON:  CT scan of the cervical spine dated 02/26/2006.    FINDINGS:  There are mild motion limitations to the examination.    The craniocervical junction is intact.  The predental space is maintained.  No prevertebral soft tissue swelling is identified.    The cervical alignment is maintained.  The vertebral body heights are maintained.  The occipital condyles are unremarkable the C1 ring is intact.  There is mild hypertrophy of the posterior elements.    There is intervertebral disc space at multiple levels.  There is variable degree of spinal canal and neural foraminal narrowing.    There is no evidence of acute fracture or traumatic malalignment of the cervical spine.    The soft tissue the neck are within normal limits.  The visualized lung apices are unremarkable.                                       CT Head Without Contrast (Final result)  Result time 03/18/25 22:17:00      Final result by David Rivas MD (03/18/25 22:17:00)                   Impression:      No acute intracranial process.    Right maxillary sinus disease.      Electronically signed by: David Rivas MD  Date:    03/18/2025  Time:    22:17               Narrative:    EXAMINATION:  CT HEAD WITHOUT CONTRAST    CLINICAL HISTORY:  Head trauma, moderate-severe;    TECHNIQUE:  Low dose axial images were obtained through the head.  Coronal and sagittal reformations were also performed. Contrast was not administered.    COMPARISON:  CT head dated 02/26/2006.    FINDINGS:  The subcutaneous tissue unremarkable.  There is hyperostosis frontalis.  There is mucosal thickening within the right maxillary sinus.  There are secretions within the  right maxillary sinus.  The remainder of the paranasal are unremarkable.  The mastoid air cells are clear.  The orbits and intraorbital contents are unremarkable.    The craniocervical junction is intact.  The sellar and parasellar structures are unremarkable.  There are no extra-axial fluid collections.  There is no evidence of intracranial hemorrhage.    The ventricles and sulci are within normal limits.  The cisterns are unremarkable.  The gray-white differentiation is maintained.  There is no dense vessel sign.  There is no evidence of mass effect.

## 2025-03-22 NOTE — PLAN OF CARE
Patient remained in bed for majority of shift, said that pain was a 5/10 but refused pain meds. VSS on RA, patient ate 100% of her meals, voiding via the purewick  Problem: Adult Inpatient Plan of Care  Goal: Plan of Care Review  Outcome: Progressing  Goal: Patient-Specific Goal (Individualized)  Outcome: Progressing  Goal: Absence of Hospital-Acquired Illness or Injury  Outcome: Progressing  Goal: Optimal Comfort and Wellbeing  Outcome: Progressing  Goal: Readiness for Transition of Care  Outcome: Progressing     Problem: Fall Injury Risk  Goal: Absence of Fall and Fall-Related Injury  Outcome: Progressing     Problem: Skin Injury Risk Increased  Goal: Skin Health and Integrity  Outcome: Progressing

## 2025-03-23 PROCEDURE — 63600175 PHARM REV CODE 636 W HCPCS: Performed by: HOSPITALIST

## 2025-03-23 PROCEDURE — 25000003 PHARM REV CODE 250: Performed by: HOSPITALIST

## 2025-03-23 PROCEDURE — 11000001 HC ACUTE MED/SURG PRIVATE ROOM

## 2025-03-23 PROCEDURE — 25000003 PHARM REV CODE 250: Performed by: NURSE PRACTITIONER

## 2025-03-23 PROCEDURE — 94761 N-INVAS EAR/PLS OXIMETRY MLT: CPT

## 2025-03-23 RX ADMIN — LEVOTHYROXINE SODIUM 112 MCG: 112 TABLET ORAL at 05:03

## 2025-03-23 RX ADMIN — POLYETHYLENE GLYCOL 3350 17 G: 17 POWDER, FOR SOLUTION ORAL at 08:03

## 2025-03-23 RX ADMIN — QUETIAPINE FUMARATE 25 MG: 25 TABLET ORAL at 08:03

## 2025-03-23 RX ADMIN — ATORVASTATIN CALCIUM 40 MG: 20 TABLET, FILM COATED ORAL at 08:03

## 2025-03-23 RX ADMIN — LAMOTRIGINE 200 MG: 100 TABLET ORAL at 08:03

## 2025-03-23 RX ADMIN — ACETAMINOPHEN 650 MG: 325 TABLET, FILM COATED ORAL at 05:03

## 2025-03-23 RX ADMIN — QUETIAPINE FUMARATE 200 MG: 200 TABLET ORAL at 09:03

## 2025-03-23 RX ADMIN — ENOXAPARIN SODIUM 40 MG: 40 INJECTION SUBCUTANEOUS at 06:03

## 2025-03-23 RX ADMIN — ACETAMINOPHEN 650 MG: 325 TABLET, FILM COATED ORAL at 02:03

## 2025-03-23 NOTE — PLAN OF CARE
Pt remained free of falls and injuries. AAOx4. Pt calm and cooperative. Purposeful hourly rounding performed. Pt swallows meds whole. IV flushed and saline locked. Pt c/o L knee pain, PRN Percocet given. LLE hinge brace maintained. Pure wick in place to suction. Frequent weight shifting encouraged, weight shift assistance provided. VSS on 1 L O2 NC. Pt sleeping in bed, even rise and fall of chest. Bed low and locked, bed alarm on, call light in reach. Side rails up x3. Will continue to monitor.

## 2025-03-23 NOTE — PLAN OF CARE
Patient complained of minimal pain during shift, requested tylenol once, given per order. Patient remained in bed and free of falls. Bed is locked and in the lowest position, patient states that she is trying to move her leg on her own as much as she can.   Problem: Adult Inpatient Plan of Care  Goal: Plan of Care Review  Outcome: Progressing  Goal: Patient-Specific Goal (Individualized)  Outcome: Progressing  Goal: Absence of Hospital-Acquired Illness or Injury  Outcome: Progressing  Goal: Optimal Comfort and Wellbeing  Outcome: Progressing  Goal: Readiness for Transition of Care  Outcome: Progressing     Problem: Fall Injury Risk  Goal: Absence of Fall and Fall-Related Injury  Outcome: Progressing     Problem: Skin Injury Risk Increased  Goal: Skin Health and Integrity  Outcome: Progressing

## 2025-03-23 NOTE — SUBJECTIVE & OBJECTIVE
Interval History:  Doing okay.        Review of Systems   Constitutional:  Negative for appetite change and fever.   Respiratory:  Negative for cough and shortness of breath.    Cardiovascular:  Negative for chest pain and palpitations.   Gastrointestinal:  Negative for abdominal pain and nausea.   Musculoskeletal:  Positive for gait problem.   Skin:  Negative for color change and rash.   Neurological:  Negative for headaches.   Psychiatric/Behavioral:  Negative for agitation.      Objective:     Vital Signs (Most Recent):  Temp: 97.3 °F (36.3 °C) (03/23/25 1108)  Pulse: 62 (03/23/25 1108)  Resp: 16 (03/23/25 1108)  BP: (!) 110/57 (03/23/25 1108)  SpO2: 97 % (03/23/25 1108) Vital Signs (24h Range):  Temp:  [97.3 °F (36.3 °C)-98.3 °F (36.8 °C)] 97.3 °F (36.3 °C)  Pulse:  [48-62] 62  Resp:  [14-20] 16  SpO2:  [95 %-98 %] 97 %  BP: (100-116)/(57-75) 110/57     Weight: 84.6 kg (186 lb 8.2 oz)  Body mass index is 27.54 kg/m².    Intake/Output Summary (Last 24 hours) at 3/23/2025 1245  Last data filed at 3/23/2025 0626  Gross per 24 hour   Intake 540 ml   Output 2850 ml   Net -2310 ml         Physical Exam  Constitutional:       General: She is not in acute distress.     Appearance: Normal appearance. She is well-developed.   HENT:      Head: Normocephalic and atraumatic.   Eyes:      Conjunctiva/sclera: Conjunctivae normal.      Pupils: Pupils are equal, round, and reactive to light.   Cardiovascular:      Rate and Rhythm: Normal rate and regular rhythm.   Pulmonary:      Effort: Pulmonary effort is normal.      Breath sounds: Normal breath sounds.   Abdominal:      General: Bowel sounds are normal.      Palpations: Abdomen is soft.      Tenderness: There is no abdominal tenderness.   Musculoskeletal:      Cervical back: Normal range of motion.      Comments: Brace to LLE   Skin:     General: Skin is warm and dry.   Neurological:      Mental Status: She is alert and oriented to person, place, and time.        "      Significant Labs: All pertinent labs within the past 24 hours have been reviewed.  CBC:   No results for input(s): "WBC", "HGB", "HCT", "PLT" in the last 48 hours.    CMP:   No results for input(s): "NA", "K", "CL", "CO2", "GLU", "BUN", "CREATININE", "CALCIUM", "PROT", "ALBUMIN", "BILITOT", "ALKPHOS", "AST", "ALT", "ANIONGAP", "EGFRNONAA" in the last 48 hours.    Invalid input(s): "ESTGFAFRICA"    Coagulation: No results for input(s): "PT", "INR", "APTT" in the last 48 hours.  Magnesium:   No results for input(s): "MG" in the last 48 hours.      Significant Imaging: I have reviewed all pertinent imaging results/findings within the past 24 hours.    Imaging Results               CT Knee Without Contrast Left (Final result)  Result time 03/19/25 01:54:39      Final result by Roshan Pettit MD (03/19/25 01:54:39)                   Impression:      Acute depressed fracture of the medial tibial plateau.    Subtle nondisplaced fracture of the proximal fibula.    Additional findings as above.    This report was flagged in Epic as abnormal.      Electronically signed by: Roshan Pettit  Date:    03/19/2025  Time:    01:54               Narrative:    EXAMINATION:  CT KNEE WITHOUT CONTRAST LEFT    CLINICAL HISTORY:  Knee trauma, tibial plateau fracture (Age >= 5y);Knee trauma, occult fracture suspected, xray done;    TECHNIQUE:  CT examination of the left knee was performed, axial imaging, sagittal and coronal reconstruction imaging is submitted.    COMPARISON:  Radiograph left knee March 18, 2025    FINDINGS:  There is acute fracture deformity involving the proximal left tibia involving the medial tibia plateau, there is mildly comminuted depressed fracture deformity of the medial tibial plateau.    There is also subtle nondisplaced fracture involving the proximal left fibula.    Small osseous density adjacent to the medial aspect of the patella appears chronic and may relate to remote injury.  There is no " evidence for patellofemoral or femoral tibial dislocation.    There is appearance consistent with hemarthrosis.    The osseous structures otherwise demonstrate chronic change.  There is no CT detectable radiopaque foreign body.                                       X-Ray Hip 2 or 3 views Left with Pelvis when performed (Final result)  Result time 03/18/25 23:59:15      Final result by David Rivas MD (03/18/25 23:59:15)                   Impression:      No acute process.      Electronically signed by: David Rivas MD  Date:    03/18/2025  Time:    23:59               Narrative:    EXAMINATION:  XR HIP WITH PELVIS WHEN PERFORMED 2 OR 3 VIEWS LEFT    CLINICAL HISTORY:  left hip pain;    TECHNIQUE:  AP view of the pelvis and frog leg lateral view of the left hip were performed.    COMPARISON:  None    FINDINGS:  The bone mineralization is within normal limits.  There is no cortical step-off.  There is no evidence of periostitis.    There is joint space narrowing with minimal osteophytosis.  The soft tissues are unremarkable.  No radiopaque foreign body is identified.    There is no evidence of a fracture or dislocation.                                       X-Ray Knee 1 or 2 View Left (Final result)  Result time 03/18/25 23:57:21   Procedure changed from X-Ray Knee 3 View Left     Final result by David Rivas MD (03/18/25 23:57:21)                   Impression:      As above.      Electronically signed by: David Rivas MD  Date:    03/18/2025  Time:    23:57               Narrative:    EXAMINATION:  XR KNEE 1 OR 2 VIEW LEFT    CLINICAL HISTORY:  left knee pain; Pain in left knee    TECHNIQUE:  One or two views of the left knee were performed.    COMPARISON:  None    FINDINGS:  Examination is somewhat limited due to suboptimal positioning along with multiple overlying artifacts.    The bone mineralization is limits.  There is no cortical step-off.  There is no evidence of periostitis.    There is tricompartmental  joint space narrowing with osteophytosis.  There are no erosive changes.    There is no definitive acute fracture or dislocation.                                       CT Lumbar Spine Without Contrast (Final result)  Result time 03/18/25 22:54:20      Final result by Roshan Pettit MD (03/18/25 22:54:20)                   Impression:      Multilevel chronic change noted, correlation for any specific level of symptomatology is needed.    There is no evidence for acute fracture deformity of the lumbar spine.    Right adrenal lesion, as discussed above, short-term follow-up is recommended.      Electronically signed by: Roshan Pettit  Date:    03/18/2025  Time:    22:54               Narrative:    EXAMINATION:  CT LUMBAR SPINE WITHOUT CONTRAST    CLINICAL HISTORY:  Low back pain, trauma;    TECHNIQUE:  Low-dose axial, sagittal and coronal reformations are obtained through the lumbar spine.  Contrast was not administered.    COMPARISON:  None.    FINDINGS:  There is mild grade 1 retrolisthesis of L2 with respect L3 and L3 with respect L4.  There is no high-grade spondylolisthesis.  Mild chronic endplate change noted, there is no evidence for high-grade or acute compression fracture deformity.  Facet arthropathy is noted, there is no evidence for facet dislocation or facet fracture deformity.  On coronal imaging there is curvature of the lumbar spine convex to the left.    The T11-12 and T12-L1 levels demonstrate no evidence for high-grade spinal canal or foraminal stenosis.    The L1-2 level demonstrates mild chronic change, there is no high-grade spinal canal or foraminal stenosis.    The L2-3 level demonstrates the aforementioned spondylolisthesis, there is mild degenerative disc bulge.  There is posterior facet arthropathy and ligamentous thickening.  There is mild spinal canal stenosis.  There is foraminal narrowing, no evidence for exiting nerve root impingement.    The L3-4 level demonstrates the  aforementioned spondylolisthesis.  There is mild diffuse degenerative disc bulge.  There is posterior facet arthropathy with ligamentous thickening.  There is mild spinal canal stenosis.  There is foraminal narrowing without evidence for exiting nerve root impingement.    The L4-5 level demonstrates diffuse degenerative disc bulge.  There is posterior facet arthropathy and ligamentous thickening.  These findings combine to produce the appearance of moderate spinal canal stenosis.  There is foraminal encroachment, right greater than left, without obvious exiting nerve root impingement however correlation for exiting right L4 nerve root symptomatology is needed.    The L5-S1 level demonstrates diffuse degenerative disc bulge with anterior impression upon the dural sac.  There is posterior facet arthropathy ligamentous thickening.  There is mild-to-moderate spinal canal stenosis.  There is foraminal narrowing without obvious exiting nerve root impingement.    There are chronic changes at the sacroiliac joints noted.    On close evaluation of available imaging there is no evidence for acute lumbar spine fracture.    There is a mass lesion of the right adrenal gland noted measuring up to approximately 4 cm on axial imaging.  This is a soft tissue mass lesion, measuring predominantly in the range of 21-25 Hounsfield units, there does appear to be a component of macroscopic fat, a small area measuring -31 Hounsfield units.  This may relate to an adrenal myelolipoma however given the small amount of macroscopic fat follow-up is recommended.                                       CT Cervical Spine Without Contrast (Final result)  Result time 03/18/25 22:21:14      Final result by David Rivas MD (03/18/25 22:21:14)                   Impression:      No evidence of acute fracture or traumatic malalignment of the cervical spine.      Electronically signed by: David Rivas MD  Date:    03/18/2025  Time:    22:21                Narrative:    EXAMINATION:  CT CERVICAL SPINE WITHOUT CONTRAST    CLINICAL HISTORY:  Neck trauma (Age >= 65y);    TECHNIQUE:  Low dose axial images, sagittal and coronal reformations were performed though the cervical spine.  Contrast was not administered.    COMPARISON:  CT scan of the cervical spine dated 02/26/2006.    FINDINGS:  There are mild motion limitations to the examination.    The craniocervical junction is intact.  The predental space is maintained.  No prevertebral soft tissue swelling is identified.    The cervical alignment is maintained.  The vertebral body heights are maintained.  The occipital condyles are unremarkable the C1 ring is intact.  There is mild hypertrophy of the posterior elements.    There is intervertebral disc space at multiple levels.  There is variable degree of spinal canal and neural foraminal narrowing.    There is no evidence of acute fracture or traumatic malalignment of the cervical spine.    The soft tissue the neck are within normal limits.  The visualized lung apices are unremarkable.                                       CT Head Without Contrast (Final result)  Result time 03/18/25 22:17:00      Final result by David Rivas MD (03/18/25 22:17:00)                   Impression:      No acute intracranial process.    Right maxillary sinus disease.      Electronically signed by: David Rivas MD  Date:    03/18/2025  Time:    22:17               Narrative:    EXAMINATION:  CT HEAD WITHOUT CONTRAST    CLINICAL HISTORY:  Head trauma, moderate-severe;    TECHNIQUE:  Low dose axial images were obtained through the head.  Coronal and sagittal reformations were also performed. Contrast was not administered.    COMPARISON:  CT head dated 02/26/2006.    FINDINGS:  The subcutaneous tissue unremarkable.  There is hyperostosis frontalis.  There is mucosal thickening within the right maxillary sinus.  There are secretions within the right maxillary sinus.  The remainder of the  paranasal are unremarkable.  The mastoid air cells are clear.  The orbits and intraorbital contents are unremarkable.    The craniocervical junction is intact.  The sellar and parasellar structures are unremarkable.  There are no extra-axial fluid collections.  There is no evidence of intracranial hemorrhage.    The ventricles and sulci are within normal limits.  The cisterns are unremarkable.  The gray-white differentiation is maintained.  There is no dense vessel sign.  There is no evidence of mass effect.
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
## 2025-03-24 PROCEDURE — 30200315 PPD INTRADERMAL TEST REV CODE 302: Performed by: HOSPITALIST

## 2025-03-24 PROCEDURE — 25000003 PHARM REV CODE 250: Performed by: NURSE PRACTITIONER

## 2025-03-24 PROCEDURE — 25000003 PHARM REV CODE 250: Performed by: HOSPITALIST

## 2025-03-24 PROCEDURE — 86580 TB INTRADERMAL TEST: CPT | Performed by: HOSPITALIST

## 2025-03-24 PROCEDURE — 63600175 PHARM REV CODE 636 W HCPCS: Performed by: HOSPITALIST

## 2025-03-24 PROCEDURE — U0002 COVID-19 LAB TEST NON-CDC: HCPCS | Performed by: STUDENT IN AN ORGANIZED HEALTH CARE EDUCATION/TRAINING PROGRAM

## 2025-03-24 PROCEDURE — 11000001 HC ACUTE MED/SURG PRIVATE ROOM

## 2025-03-24 RX ORDER — AMOXICILLIN 250 MG
2 CAPSULE ORAL NIGHTLY
Status: COMPLETED | OUTPATIENT
Start: 2025-03-24 | End: 2025-03-25

## 2025-03-24 RX ADMIN — TUBERCULIN PURIFIED PROTEIN DERIVATIVE 5 UNITS: 5 INJECTION, SOLUTION INTRADERMAL at 03:03

## 2025-03-24 RX ADMIN — POLYETHYLENE GLYCOL 3350 17 G: 17 POWDER, FOR SOLUTION ORAL at 08:03

## 2025-03-24 RX ADMIN — OXYCODONE HYDROCHLORIDE AND ACETAMINOPHEN 1 TABLET: 7.5; 325 TABLET ORAL at 08:03

## 2025-03-24 RX ADMIN — ENOXAPARIN SODIUM 40 MG: 40 INJECTION SUBCUTANEOUS at 04:03

## 2025-03-24 RX ADMIN — QUETIAPINE FUMARATE 200 MG: 200 TABLET ORAL at 08:03

## 2025-03-24 RX ADMIN — LAMOTRIGINE 200 MG: 100 TABLET ORAL at 08:03

## 2025-03-24 RX ADMIN — ATORVASTATIN CALCIUM 40 MG: 20 TABLET, FILM COATED ORAL at 08:03

## 2025-03-24 RX ADMIN — QUETIAPINE FUMARATE 25 MG: 25 TABLET ORAL at 08:03

## 2025-03-24 RX ADMIN — LEVOTHYROXINE SODIUM 112 MCG: 112 TABLET ORAL at 05:03

## 2025-03-24 RX ADMIN — SENNOSIDES AND DOCUSATE SODIUM 2 TABLET: 50; 8.6 TABLET ORAL at 08:03

## 2025-03-24 NOTE — SUBJECTIVE & OBJECTIVE
Interval History:  Doing okay.  May be going to SNF soon.       Review of Systems   Constitutional:  Negative for appetite change and fever.   Respiratory:  Negative for cough and shortness of breath.    Cardiovascular:  Negative for chest pain and palpitations.   Gastrointestinal:  Negative for abdominal pain and nausea.   Musculoskeletal:  Positive for gait problem.   Skin:  Negative for color change and rash.   Neurological:  Negative for headaches.   Psychiatric/Behavioral:  Negative for agitation.      Objective:     Vital Signs (Most Recent):  Temp: 97.8 °F (36.6 °C) (03/24/25 1126)  Pulse: 69 (03/24/25 1126)  Resp: 17 (03/24/25 1126)  BP: 108/65 (03/24/25 1126)  SpO2: 96 % (03/24/25 1126) Vital Signs (24h Range):  Temp:  [97.3 °F (36.3 °C)-98.4 °F (36.9 °C)] 97.8 °F (36.6 °C)  Pulse:  [59-98] 69  Resp:  [16-20] 17  SpO2:  [94 %-100 %] 96 %  BP: (100-128)/(63-76) 108/65     Weight: 84.6 kg (186 lb 8.2 oz)  Body mass index is 27.54 kg/m².    Intake/Output Summary (Last 24 hours) at 3/24/2025 1420  Last data filed at 3/24/2025 0943  Gross per 24 hour   Intake 900 ml   Output 3000 ml   Net -2100 ml         Physical Exam  Constitutional:       General: She is not in acute distress.     Appearance: Normal appearance. She is well-developed.   HENT:      Head: Normocephalic and atraumatic.   Eyes:      Conjunctiva/sclera: Conjunctivae normal.      Pupils: Pupils are equal, round, and reactive to light.   Cardiovascular:      Rate and Rhythm: Normal rate and regular rhythm.   Pulmonary:      Effort: Pulmonary effort is normal.      Breath sounds: Normal breath sounds.   Abdominal:      General: Bowel sounds are normal.      Palpations: Abdomen is soft.      Tenderness: There is no abdominal tenderness.   Musculoskeletal:      Cervical back: Normal range of motion.      Comments: Brace to LLE   Skin:     General: Skin is warm and dry.   Neurological:      Mental Status: She is alert and oriented to person, place, and  "time.             Significant Labs: All pertinent labs within the past 24 hours have been reviewed.  CBC:   No results for input(s): "WBC", "HGB", "HCT", "PLT" in the last 48 hours.    CMP:   No results for input(s): "NA", "K", "CL", "CO2", "GLU", "BUN", "CREATININE", "CALCIUM", "PROT", "ALBUMIN", "BILITOT", "ALKPHOS", "AST", "ALT", "ANIONGAP", "EGFRNONAA" in the last 48 hours.    Invalid input(s): "ESTGFAFRICA"    Coagulation: No results for input(s): "PT", "INR", "APTT" in the last 48 hours.  Magnesium:   No results for input(s): "MG" in the last 48 hours.      Significant Imaging: I have reviewed all pertinent imaging results/findings within the past 24 hours.    Imaging Results               CT Knee Without Contrast Left (Final result)  Result time 03/19/25 01:54:39      Final result by Roshan Pettit MD (03/19/25 01:54:39)                   Impression:      Acute depressed fracture of the medial tibial plateau.    Subtle nondisplaced fracture of the proximal fibula.    Additional findings as above.    This report was flagged in Epic as abnormal.      Electronically signed by: Roshan Pettit  Date:    03/19/2025  Time:    01:54               Narrative:    EXAMINATION:  CT KNEE WITHOUT CONTRAST LEFT    CLINICAL HISTORY:  Knee trauma, tibial plateau fracture (Age >= 5y);Knee trauma, occult fracture suspected, xray done;    TECHNIQUE:  CT examination of the left knee was performed, axial imaging, sagittal and coronal reconstruction imaging is submitted.    COMPARISON:  Radiograph left knee March 18, 2025    FINDINGS:  There is acute fracture deformity involving the proximal left tibia involving the medial tibia plateau, there is mildly comminuted depressed fracture deformity of the medial tibial plateau.    There is also subtle nondisplaced fracture involving the proximal left fibula.    Small osseous density adjacent to the medial aspect of the patella appears chronic and may relate to remote injury.  There " is no evidence for patellofemoral or femoral tibial dislocation.    There is appearance consistent with hemarthrosis.    The osseous structures otherwise demonstrate chronic change.  There is no CT detectable radiopaque foreign body.                                       X-Ray Hip 2 or 3 views Left with Pelvis when performed (Final result)  Result time 03/18/25 23:59:15      Final result by David Rivas MD (03/18/25 23:59:15)                   Impression:      No acute process.      Electronically signed by: David Rivas MD  Date:    03/18/2025  Time:    23:59               Narrative:    EXAMINATION:  XR HIP WITH PELVIS WHEN PERFORMED 2 OR 3 VIEWS LEFT    CLINICAL HISTORY:  left hip pain;    TECHNIQUE:  AP view of the pelvis and frog leg lateral view of the left hip were performed.    COMPARISON:  None    FINDINGS:  The bone mineralization is within normal limits.  There is no cortical step-off.  There is no evidence of periostitis.    There is joint space narrowing with minimal osteophytosis.  The soft tissues are unremarkable.  No radiopaque foreign body is identified.    There is no evidence of a fracture or dislocation.                                       X-Ray Knee 1 or 2 View Left (Final result)  Result time 03/18/25 23:57:21   Procedure changed from X-Ray Knee 3 View Left     Final result by David Rivas MD (03/18/25 23:57:21)                   Impression:      As above.      Electronically signed by: David Rivas MD  Date:    03/18/2025  Time:    23:57               Narrative:    EXAMINATION:  XR KNEE 1 OR 2 VIEW LEFT    CLINICAL HISTORY:  left knee pain; Pain in left knee    TECHNIQUE:  One or two views of the left knee were performed.    COMPARISON:  None    FINDINGS:  Examination is somewhat limited due to suboptimal positioning along with multiple overlying artifacts.    The bone mineralization is limits.  There is no cortical step-off.  There is no evidence of periostitis.    There is  tricompartmental joint space narrowing with osteophytosis.  There are no erosive changes.    There is no definitive acute fracture or dislocation.                                       CT Lumbar Spine Without Contrast (Final result)  Result time 03/18/25 22:54:20      Final result by Roshan Pettit MD (03/18/25 22:54:20)                   Impression:      Multilevel chronic change noted, correlation for any specific level of symptomatology is needed.    There is no evidence for acute fracture deformity of the lumbar spine.    Right adrenal lesion, as discussed above, short-term follow-up is recommended.      Electronically signed by: Roshan Pettit  Date:    03/18/2025  Time:    22:54               Narrative:    EXAMINATION:  CT LUMBAR SPINE WITHOUT CONTRAST    CLINICAL HISTORY:  Low back pain, trauma;    TECHNIQUE:  Low-dose axial, sagittal and coronal reformations are obtained through the lumbar spine.  Contrast was not administered.    COMPARISON:  None.    FINDINGS:  There is mild grade 1 retrolisthesis of L2 with respect L3 and L3 with respect L4.  There is no high-grade spondylolisthesis.  Mild chronic endplate change noted, there is no evidence for high-grade or acute compression fracture deformity.  Facet arthropathy is noted, there is no evidence for facet dislocation or facet fracture deformity.  On coronal imaging there is curvature of the lumbar spine convex to the left.    The T11-12 and T12-L1 levels demonstrate no evidence for high-grade spinal canal or foraminal stenosis.    The L1-2 level demonstrates mild chronic change, there is no high-grade spinal canal or foraminal stenosis.    The L2-3 level demonstrates the aforementioned spondylolisthesis, there is mild degenerative disc bulge.  There is posterior facet arthropathy and ligamentous thickening.  There is mild spinal canal stenosis.  There is foraminal narrowing, no evidence for exiting nerve root impingement.    The L3-4 level  demonstrates the aforementioned spondylolisthesis.  There is mild diffuse degenerative disc bulge.  There is posterior facet arthropathy with ligamentous thickening.  There is mild spinal canal stenosis.  There is foraminal narrowing without evidence for exiting nerve root impingement.    The L4-5 level demonstrates diffuse degenerative disc bulge.  There is posterior facet arthropathy and ligamentous thickening.  These findings combine to produce the appearance of moderate spinal canal stenosis.  There is foraminal encroachment, right greater than left, without obvious exiting nerve root impingement however correlation for exiting right L4 nerve root symptomatology is needed.    The L5-S1 level demonstrates diffuse degenerative disc bulge with anterior impression upon the dural sac.  There is posterior facet arthropathy ligamentous thickening.  There is mild-to-moderate spinal canal stenosis.  There is foraminal narrowing without obvious exiting nerve root impingement.    There are chronic changes at the sacroiliac joints noted.    On close evaluation of available imaging there is no evidence for acute lumbar spine fracture.    There is a mass lesion of the right adrenal gland noted measuring up to approximately 4 cm on axial imaging.  This is a soft tissue mass lesion, measuring predominantly in the range of 21-25 Hounsfield units, there does appear to be a component of macroscopic fat, a small area measuring -31 Hounsfield units.  This may relate to an adrenal myelolipoma however given the small amount of macroscopic fat follow-up is recommended.                                       CT Cervical Spine Without Contrast (Final result)  Result time 03/18/25 22:21:14      Final result by David Rivas MD (03/18/25 22:21:14)                   Impression:      No evidence of acute fracture or traumatic malalignment of the cervical spine.      Electronically signed by: David Rivas  MD  Date:    03/18/2025  Time:    22:21               Narrative:    EXAMINATION:  CT CERVICAL SPINE WITHOUT CONTRAST    CLINICAL HISTORY:  Neck trauma (Age >= 65y);    TECHNIQUE:  Low dose axial images, sagittal and coronal reformations were performed though the cervical spine.  Contrast was not administered.    COMPARISON:  CT scan of the cervical spine dated 02/26/2006.    FINDINGS:  There are mild motion limitations to the examination.    The craniocervical junction is intact.  The predental space is maintained.  No prevertebral soft tissue swelling is identified.    The cervical alignment is maintained.  The vertebral body heights are maintained.  The occipital condyles are unremarkable the C1 ring is intact.  There is mild hypertrophy of the posterior elements.    There is intervertebral disc space at multiple levels.  There is variable degree of spinal canal and neural foraminal narrowing.    There is no evidence of acute fracture or traumatic malalignment of the cervical spine.    The soft tissue the neck are within normal limits.  The visualized lung apices are unremarkable.                                       CT Head Without Contrast (Final result)  Result time 03/18/25 22:17:00      Final result by David Rivas MD (03/18/25 22:17:00)                   Impression:      No acute intracranial process.    Right maxillary sinus disease.      Electronically signed by: David Rivas MD  Date:    03/18/2025  Time:    22:17               Narrative:    EXAMINATION:  CT HEAD WITHOUT CONTRAST    CLINICAL HISTORY:  Head trauma, moderate-severe;    TECHNIQUE:  Low dose axial images were obtained through the head.  Coronal and sagittal reformations were also performed. Contrast was not administered.    COMPARISON:  CT head dated 02/26/2006.    FINDINGS:  The subcutaneous tissue unremarkable.  There is hyperostosis frontalis.  There is mucosal thickening within the right maxillary sinus.  There are secretions within the  right maxillary sinus.  The remainder of the paranasal are unremarkable.  The mastoid air cells are clear.  The orbits and intraorbital contents are unremarkable.    The craniocervical junction is intact.  The sellar and parasellar structures are unremarkable.  There are no extra-axial fluid collections.  There is no evidence of intracranial hemorrhage.    The ventricles and sulci are within normal limits.  The cisterns are unremarkable.  The gray-white differentiation is maintained.  There is no dense vessel sign.  There is no evidence of mass effect.

## 2025-03-24 NOTE — PLAN OF CARE
Sw completed Locet and Passr. PPD, Covid and Chest xray have been ordered.   03/24/25 7292   Post-Acute Status   Post-Acute Authorization Placement   Post-Acute Placement Status Referrals Sent   Discharge Plan   Discharge Plan A Skilled Nursing Facility   Discharge Plan B Home Health

## 2025-03-24 NOTE — PLAN OF CARE
Met with patient to review discharge recommendation of SNF and is agreeable to plan    Patient/family provided list of facilities in-network with patient's payor plan. Providers that are owned, operated, or affiliated with [facility] are included on the list.     Notified that referral sent to below listed facilities from in-network list based on proximity to home/family support:   [facility]  2.  3.  4.  5. (can send more than 5)    Patient/family instructed to identify preference.    Preferred Facility: (if more than 1, listed in order of descending preference)  [facility] has accepted this patient medically for SNF.
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
## 2025-03-25 PROBLEM — F31.9 BIPOLAR 1 DISORDER: Status: ACTIVE | Noted: 2025-03-25

## 2025-03-25 LAB — SARS-COV-2 RDRP RESP QL NAA+PROBE: NEGATIVE

## 2025-03-25 PROCEDURE — 94761 N-INVAS EAR/PLS OXIMETRY MLT: CPT

## 2025-03-25 PROCEDURE — 25000003 PHARM REV CODE 250: Performed by: NURSE PRACTITIONER

## 2025-03-25 PROCEDURE — 97535 SELF CARE MNGMENT TRAINING: CPT

## 2025-03-25 PROCEDURE — 63600175 PHARM REV CODE 636 W HCPCS: Performed by: HOSPITALIST

## 2025-03-25 PROCEDURE — 25000003 PHARM REV CODE 250: Performed by: HOSPITALIST

## 2025-03-25 PROCEDURE — 97110 THERAPEUTIC EXERCISES: CPT | Mod: CQ

## 2025-03-25 PROCEDURE — 97116 GAIT TRAINING THERAPY: CPT | Mod: CQ

## 2025-03-25 PROCEDURE — 11000001 HC ACUTE MED/SURG PRIVATE ROOM

## 2025-03-25 PROCEDURE — 63600175 PHARM REV CODE 636 W HCPCS: Performed by: NURSE PRACTITIONER

## 2025-03-25 RX ADMIN — LAMOTRIGINE 200 MG: 100 TABLET ORAL at 08:03

## 2025-03-25 RX ADMIN — ENOXAPARIN SODIUM 40 MG: 40 INJECTION SUBCUTANEOUS at 04:03

## 2025-03-25 RX ADMIN — QUETIAPINE FUMARATE 200 MG: 200 TABLET ORAL at 08:03

## 2025-03-25 RX ADMIN — SENNOSIDES AND DOCUSATE SODIUM 2 TABLET: 50; 8.6 TABLET ORAL at 08:03

## 2025-03-25 RX ADMIN — POLYETHYLENE GLYCOL 3350 17 G: 17 POWDER, FOR SOLUTION ORAL at 08:03

## 2025-03-25 RX ADMIN — OXYCODONE HYDROCHLORIDE AND ACETAMINOPHEN 1 TABLET: 7.5; 325 TABLET ORAL at 02:03

## 2025-03-25 RX ADMIN — QUETIAPINE FUMARATE 25 MG: 25 TABLET ORAL at 08:03

## 2025-03-25 RX ADMIN — SODIUM CHLORIDE, POTASSIUM CHLORIDE, SODIUM LACTATE AND CALCIUM CHLORIDE 500 ML: 600; 310; 30; 20 INJECTION, SOLUTION INTRAVENOUS at 02:03

## 2025-03-25 RX ADMIN — LEVOTHYROXINE SODIUM 112 MCG: 112 TABLET ORAL at 05:03

## 2025-03-25 RX ADMIN — ATORVASTATIN CALCIUM 40 MG: 20 TABLET, FILM COATED ORAL at 08:03

## 2025-03-25 NOTE — PLAN OF CARE
Problem: Adult Inpatient Plan of Care  Goal: Patient-Specific Goal (Individualized)  Outcome: Progressing     Problem: Adult Inpatient Plan of Care  Goal: Absence of Hospital-Acquired Illness or Injury  Outcome: Progressing     Problem: Adult Inpatient Plan of Care  Goal: Readiness for Transition of Care  Outcome: Progressing     Problem: Fall Injury Risk  Goal: Absence of Fall and Fall-Related Injury  Outcome: Progressing

## 2025-03-25 NOTE — PROGRESS NOTES
Physical Therapy POC Update  Discussed with PTA and reviewed chart, patient more appropriate for frequency of 5x/week and updated as below.       03/25/25 1130   Assessment   Therapy Frequency 5 x/week

## 2025-03-25 NOTE — PT/OT/SLP PROGRESS
Physical Therapy Treatment    Patient Name:  Anika Barajas   MRN:  [id number]    Recommendations:     Discharge Recommendations: Moderate Intensity Therapy  Discharge Equipment Recommendations: to be determined by next level of care  Barriers to discharge: Inaccessible home and Decreased caregiver support    Assessment:     Anika Barajas is a 64 y.o. female admitted with a medical diagnosis of Tibial plateau fracture.  She presents with the following impairments/functional limitations: weakness, gait instability, pain, orthopedic precautions, impaired balance, impaired endurance, impaired functional mobility, impaired self care skills, decreased lower extremity function, decreased ROM, decreased safety awareness, decreased upper extremity function.    Supine<>sit with SBA  Sit>stand with RW and Hilario x 2, cueing for WB precautions, R ankle brace and shoe donned  Amb 2 x 7' with RW and Hilario x 2, LLE TTWB, repeated cueing to maintain precautions  Static stand at sink x 2 mins with CGA  Scoot toward HOB with SBA  Pt with good progress in functional mobility, able to perform limited ambulation and tolerate an extended time in standing with precautions maintained   Rec Moderate Intensity therapy    Rehab Prognosis: Good; patient would benefit from acute skilled PT services to address these deficits and reach maximum level of function.    Recent Surgery: * No surgery found *      Plan:     During this hospitalization, patient to be seen 3 x/week to address the identified rehab impairments via gait training, therapeutic activities, therapeutic exercises, neuromuscular re-education and progress toward the following goals:    Plan of Care Expires:  04/18/25    Subjective     Chief Complaint: pain  Patient/Family Comments/goals: pt agreeable to therapy and reported feeling better afterward  Pain/Comfort:  Pain Rating 1: 5/10  Location - Side 1: Left  Location - Orientation 1: lower  Location 1: knee  Pain  Addressed 1: Reposition, Distraction, Cessation of Activity  Pain Rating Post-Intervention 1: 5/10      Objective:     Communicated with nurse Yaritza prior to session.  Patient found HOB elevated with peripheral IV, PureWick upon PT entry to room.     General Precautions: Standard, fall  Orthopedic Precautions: LLE toe touch weight bearing  Braces: Knee immobilizer  Respiratory Status: Room air     Functional Mobility:  Bed Mobility:     Scooting: stand by assistance  Supine to Sit: stand by assistance  Sit to Supine: stand by assistance  Transfers:     Sit to Stand:  minimum assistance and of 2 persons with rolling walker  Gait: 2 x 7' with RW and Hilario x 2, LLE TTWB, repeated cueing to maintain precautions      AM-PAC 6 CLICK MOBILITY  Turning over in bed (including adjusting bedclothes, sheets and blankets)?: 3  Sitting down on and standing up from a chair with arms (e.g., wheelchair, bedside commode, etc.): 2  Moving from lying on back to sitting on the side of the bed?: 4  Moving to and from a bed to a chair (including a wheelchair)?: 2  Need to walk in hospital room?: 2  Climbing 3-5 steps with a railing?: 1  Basic Mobility Total Score: 14       Treatment & Education:  Supine therex LLE: AP, SLR, hip abd/add x 10  Gait training as noted  Static stand at sink x 2 mins with CGA    Patient left HOB elevated with all lines intact, call button in reach, and PCT present..    GOALS:   Multidisciplinary Problems       Physical Therapy Goals          Problem: Physical Therapy    Goal Priority Disciplines Outcome Interventions   Physical Therapy Goal     PT, PT/OT Progressing    Description: 1. [place] with Bed Mobility  2. Modified [place] with Transfers using LRAD  3. Tolerate OOB x 2 hours   4. Gait  x 10 feet with maintaining weight-bearing precaution(s) using Rolling Walker.                          DME Justifications:  No DME recommended requiring DME justifications    Time Tracking:     PT Received On:  03/25/25  PT Start Time: 1034     PT Stop Time: 1104  PT Total Time (min): 30 min     Billable Minutes: Gait Training 15 and Therapeutic Exercise 15    Treatment Type: Treatment  PT/PTA: PTA     Number of PTA visits since last PT visit: 1 03/25/2025

## 2025-03-25 NOTE — PLAN OF CARE
SW met with patient at bedside along with CM , Giovanni. SW and  spoke to patient about patient not having skilled nursing benefits with just Medicare part B only. Patient very upset,  explained that she can go into senior care placement with the understanding that patient will be a resident. Patient agreed but also does not want to give up apartment. Patient states she has a payee that takes care of her financials--Clarence Galdamez but does not have phone number. Patient states that SW can call patient Gneesis huber [phone].     SW will send referral to [facility] to see if patient can qualify using Medicaid benefits. SW will continue to follow.     4:15pm  SW received updated info on patient payee Clarence Galdamez- [phone]  [email or web address]      3/26 8:30am  Spoke with Mr. Clarence Galdamez and he prefers to be contacted at email [email or web address]

## 2025-03-25 NOTE — PLAN OF CARE
Spoke with Angelina ([facility]) [phone] to follow up on rejected request for 142 through hospital exemption - Angelina reports all patients with mental illness will be referred to [facility] to confirm if patient eligible for hospital exemption - followed up with Noni ([facility]) regarding change in procedure? - Noni unaware of any changes - will escalate to leadership

## 2025-03-25 NOTE — CODE/ RAPID DOCUMENTATION
"RAPID RESPONSE NURSE CHART REVIEW        Chart Reviewed: 03/25/2025, 2:16 AM    MRN: [id number]  Bed: B309/B309 A    Dx: Tibial plateau fracture    Anika Barajas has a past medical history of Seizures and Thyroid disease.    Last VS: BP (!) 91/50   Pulse (!) 56   Temp 98.5 °F (36.9 °C) (Oral)   Resp 18   Ht 5' 9" (1.753 m)   Wt 84.6 kg (186 lb 8.2 oz)   SpO2 95%   Breastfeeding No   BMI 27.54 kg/m²     24H Vital Sign Range:  Temp:  [97.8 °F (36.6 °C)-98.6 °F (37 °C)]   Pulse:  [56-70]   Resp:  [16-18]   BP: ()/(48-76)   SpO2:  [91 %-96 %]     Level of Consciousness (AVPU): alert    No results for input(s): "CBC", "WBC", "HGB", "HCT", "PLT" in the last 72 hours.    No results for input(s): "NA", "K", "CL", "CO2", "BUN", "CREATININE", "GLU", "PHOS", "MG" in the last 72 hours.    Invalid input(s): "CMP", "TBIL"     No results for input(s): "PH", "PCO2", "PO2", "HCO3", "POCSATURATED", "BE" in the last 72 hours.     OXYGEN:  Flow (L/min) (Oxygen Therapy): 1          MEWS score: 2    Bedside RNMarcial      Noted a low BP on pt. Pt told nurse that she did not want to be woken up, however, her SBP was 85 (@2341) and it required a recheck. Pts updated SBP 82 (@0008). Spoke to Hospitalist and got an order for a 500mL bolus. Bolus order put in @0035. Message nurse about bolus and about rechecking the pts BP after the bolus was completed.       0130--noted that bolus was not started and there were not an updated BP on the pt. Called the unit and spoke to the OC/Charge about getting an updated set of vitals and checking on the pts bolus.     0200 bolus started (pt lost IV access...delaying start of fluids), 0215 updated vitals in. SBP 94.    Updated Hospitalist Satish about pts updated BP and current status of bolus. No new orders at this time.      Instructed to call [phone] for further concerns or assistance.    Víctor Beaulieu RN        "

## 2025-03-25 NOTE — PLAN OF CARE
03/25/25 0950   Discharge Reassessment   Assessment Type Discharge Planning Reassessment   Did the patient's condition or plan change since previous assessment? No   Discharge Plan discussed with: Patient   Communicated PARMJIT with patient/caregiver Date not available/Unable to determine   Discharge Plan A Skilled Nursing Facility   Discharge Plan B Home;Home Health   DME Needed Upon Discharge  none   Transition of Care Barriers None   Why the patient remains in the hospital Placement issues   Post-Acute Status   Post-Acute Authorization Placement   Post-Acute Placement Status Pending state direction/certification   Discharge Delays None known at this time     SW received messages from the state that patient is requiring Level II for nursing home admission.
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
## 2025-03-25 NOTE — SUBJECTIVE & OBJECTIVE
Interval History: No overnight events. Working on SNF.     Review of Systems   Constitutional:  Negative for appetite change and fever.   Respiratory:  Negative for cough and shortness of breath.    Cardiovascular:  Negative for chest pain and palpitations.   Gastrointestinal:  Negative for abdominal pain and nausea.   Musculoskeletal:  Positive for gait problem.   Skin:  Negative for color change and rash.   Neurological:  Negative for headaches.   Psychiatric/Behavioral:  Negative for agitation.      Objective:     Vital Signs (Most Recent):  Temp: 98.3 °F (36.8 °C) (03/25/25 1108)  Pulse: 67 (03/25/25 1458)  Resp: 16 (03/25/25 1458)  BP: (!) 109/57 (03/25/25 1458)  SpO2: 96 % (03/25/25 1458) Vital Signs (24h Range):  Temp:  [97.9 °F (36.6 °C)-98.5 °F (36.9 °C)] 98.3 °F (36.8 °C)  Pulse:  [56-70] 67  Resp:  [16-18] 16  SpO2:  [91 %-96 %] 96 %  BP: ()/(48-67) 109/57     Weight: 84.6 kg (186 lb 8.2 oz)  Body mass index is 27.54 kg/m².    Intake/Output Summary (Last 24 hours) at 3/25/2025 1506  Last data filed at 3/25/2025 1325  Gross per 24 hour   Intake 480 ml   Output 1600 ml   Net -1120 ml         Physical Exam  Constitutional:       General: She is not in acute distress.  Pulmonary:      Effort: No respiratory distress.      Breath sounds: No wheezing.   Abdominal:      General: There is no distension.      Tenderness: There is no abdominal tenderness.   Musculoskeletal:      Right lower leg: No edema.      Comments: Left knee in brace    Neurological:      General: No focal deficit present.      Mental Status: She is oriented to person, place, and time.               Significant Labs: All pertinent labs within the past 24 hours have been reviewed.    Significant Imaging: I have reviewed all pertinent imaging results/findings within the past 24 hours.
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
## 2025-03-25 NOTE — PLAN OF CARE
Angelina ([facility]) reports clarification - they are able to approve 142 through hospital exemption as per previous procedure - 142 will be available shortly

## 2025-03-25 NOTE — PLAN OF CARE
Medicare Message     Important Message from Medicare regarding Discharge Appeal Rights -- Explained to patient/caregiver; Signed/date by patient/caregiver   Date IMM was signed -- 3/25/2025   Time IMM was signed -- 1010

## 2025-03-25 NOTE — ASSESSMENT & PLAN NOTE
CT- Acute depressed fracture of the medial tibial plateau. Subtle nondisplaced fracture of the proximal fibula.    Consult Ortho  Percocet p.r.n. for pain    Ortho recs:   Order hinge knee brace and consult PT. Non-operative treatment at this time. Continue with current pain regimen.     PT/OT - SNF

## 2025-03-25 NOTE — PLAN OF CARE
[facility] called [facility] and spoke with John in admissions to verify that they are able to accept patient with Medicare part B only. John stated they will verify and call [facility] back.

## 2025-03-26 LAB
TB INDURATION 48 - 72 HR READ: 0 MM
TB SKIN TEST 48 - 72 HR READ: NEGATIVE

## 2025-03-26 PROCEDURE — 94761 N-INVAS EAR/PLS OXIMETRY MLT: CPT

## 2025-03-26 PROCEDURE — 25000003 PHARM REV CODE 250: Performed by: HOSPITALIST

## 2025-03-26 PROCEDURE — 63600175 PHARM REV CODE 636 W HCPCS: Performed by: HOSPITALIST

## 2025-03-26 PROCEDURE — 11000001 HC ACUTE MED/SURG PRIVATE ROOM

## 2025-03-26 PROCEDURE — 97116 GAIT TRAINING THERAPY: CPT

## 2025-03-26 PROCEDURE — 97542 WHEELCHAIR MNGMENT TRAINING: CPT

## 2025-03-26 PROCEDURE — 25000003 PHARM REV CODE 250: Performed by: NURSE PRACTITIONER

## 2025-03-26 RX ADMIN — QUETIAPINE FUMARATE 200 MG: 200 TABLET ORAL at 09:03

## 2025-03-26 RX ADMIN — LEVOTHYROXINE SODIUM 112 MCG: 112 TABLET ORAL at 05:03

## 2025-03-26 RX ADMIN — QUETIAPINE FUMARATE 25 MG: 25 TABLET ORAL at 09:03

## 2025-03-26 RX ADMIN — ATORVASTATIN CALCIUM 40 MG: 20 TABLET, FILM COATED ORAL at 09:03

## 2025-03-26 RX ADMIN — LAMOTRIGINE 200 MG: 100 TABLET ORAL at 09:03

## 2025-03-26 RX ADMIN — POLYETHYLENE GLYCOL 3350 17 G: 17 POWDER, FOR SOLUTION ORAL at 09:03

## 2025-03-26 RX ADMIN — ENOXAPARIN SODIUM 40 MG: 40 INJECTION SUBCUTANEOUS at 05:03

## 2025-03-26 RX ADMIN — OXYCODONE HYDROCHLORIDE AND ACETAMINOPHEN 1 TABLET: 7.5; 325 TABLET ORAL at 09:03

## 2025-03-26 NOTE — PLAN OF CARE
Problem: Adult Inpatient Plan of Care  Goal: Patient-Specific Goal (Individualized)  Outcome: Progressing     Problem: Adult Inpatient Plan of Care  Goal: Absence of Hospital-Acquired Illness or Injury  Outcome: Progressing     Problem: Fall Injury Risk  Goal: Absence of Fall and Fall-Related Injury  Outcome: Progressing     Problem: Skin Injury Risk Increased  Goal: Skin Health and Integrity  Outcome: Progressing
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
## 2025-03-26 NOTE — PLAN OF CARE
NONA called Clarence Galdamez, patient's payee and explained what ocuments are needed for NH placement. NONA explained that NH would be contacting payee.     NONA called [facility] and spoke with NONA, John, and also Financial office, Rosetta, gave them patient's payee information. NONA to continue to follow.

## 2025-03-26 NOTE — SUBJECTIVE & OBJECTIVE
Interval History: No overnight events. Working on placement. Delayed due to insurance issues.     Review of Systems   Constitutional:  Negative for appetite change and fever.   Respiratory:  Negative for cough and shortness of breath.    Cardiovascular:  Negative for chest pain and palpitations.   Gastrointestinal:  Negative for abdominal pain and nausea.   Musculoskeletal:  Positive for gait problem.   Skin:  Negative for color change and rash.   Neurological:  Negative for headaches.   Psychiatric/Behavioral:  Negative for agitation.      Objective:     Vital Signs (Most Recent):  Temp: 97.6 °F (36.4 °C) (03/26/25 1543)  Pulse: 69 (03/26/25 1543)  Resp: 18 (03/26/25 1543)  BP: 105/62 (03/26/25 1543)  SpO2: 95 % (03/26/25 1543) Vital Signs (24h Range):  Temp:  [97.6 °F (36.4 °C)-98.5 °F (36.9 °C)] 97.6 °F (36.4 °C)  Pulse:  [60-69] 69  Resp:  [16-20] 18  SpO2:  [95 %-96 %] 95 %  BP: (102-117)/(58-62) 105/62     Weight: 84.6 kg (186 lb 8.2 oz)  Body mass index is 27.54 kg/m².    Intake/Output Summary (Last 24 hours) at 3/26/2025 1829  Last data filed at 3/26/2025 1635  Gross per 24 hour   Intake --   Output 3100 ml   Net -3100 ml         Physical Exam  Constitutional:       General: She is not in acute distress.  Pulmonary:      Effort: No respiratory distress.      Breath sounds: No wheezing.   Abdominal:      General: There is no distension.      Tenderness: There is no abdominal tenderness.   Musculoskeletal:      Right lower leg: No edema.      Comments: Left knee in brace    Neurological:      General: No focal deficit present.      Mental Status: She is oriented to person, place, and time.               Significant Labs: All pertinent labs within the past 24 hours have been reviewed.    Significant Imaging: I have reviewed all pertinent imaging results/findings within the past 24 hours.

## 2025-03-27 PROCEDURE — 11000001 HC ACUTE MED/SURG PRIVATE ROOM

## 2025-03-27 PROCEDURE — 97110 THERAPEUTIC EXERCISES: CPT | Mod: CQ

## 2025-03-27 PROCEDURE — 25000003 PHARM REV CODE 250: Performed by: NURSE PRACTITIONER

## 2025-03-27 PROCEDURE — 25000003 PHARM REV CODE 250: Performed by: HOSPITALIST

## 2025-03-27 PROCEDURE — 63600175 PHARM REV CODE 636 W HCPCS: Performed by: HOSPITALIST

## 2025-03-27 PROCEDURE — 25000003 PHARM REV CODE 250: Performed by: STUDENT IN AN ORGANIZED HEALTH CARE EDUCATION/TRAINING PROGRAM

## 2025-03-27 PROCEDURE — 94761 N-INVAS EAR/PLS OXIMETRY MLT: CPT

## 2025-03-27 PROCEDURE — 97530 THERAPEUTIC ACTIVITIES: CPT | Mod: CQ

## 2025-03-27 PROCEDURE — 97535 SELF CARE MNGMENT TRAINING: CPT

## 2025-03-27 RX ORDER — AMOXICILLIN 250 MG
2 CAPSULE ORAL DAILY
Status: DISCONTINUED | OUTPATIENT
Start: 2025-03-27 | End: 2025-04-07 | Stop reason: HOSPADM

## 2025-03-27 RX ORDER — ACETAMINOPHEN 500 MG
1000 TABLET ORAL EVERY 8 HOURS
Status: DISCONTINUED | OUTPATIENT
Start: 2025-03-27 | End: 2025-04-02

## 2025-03-27 RX ORDER — OXYCODONE AND ACETAMINOPHEN 7.5; 325 MG/1; MG/1
1 TABLET ORAL EVERY 8 HOURS PRN
Refills: 0 | Status: DISCONTINUED | OUTPATIENT
Start: 2025-03-27 | End: 2025-04-07 | Stop reason: HOSPADM

## 2025-03-27 RX ADMIN — ATORVASTATIN CALCIUM 40 MG: 20 TABLET, FILM COATED ORAL at 09:03

## 2025-03-27 RX ADMIN — ENOXAPARIN SODIUM 40 MG: 40 INJECTION SUBCUTANEOUS at 05:03

## 2025-03-27 RX ADMIN — LAMOTRIGINE 200 MG: 100 TABLET ORAL at 09:03

## 2025-03-27 RX ADMIN — ACETAMINOPHEN 1000 MG: 500 TABLET ORAL at 01:03

## 2025-03-27 RX ADMIN — QUETIAPINE FUMARATE 200 MG: 200 TABLET ORAL at 08:03

## 2025-03-27 RX ADMIN — LEVOTHYROXINE SODIUM 112 MCG: 112 TABLET ORAL at 05:03

## 2025-03-27 RX ADMIN — QUETIAPINE FUMARATE 25 MG: 25 TABLET ORAL at 09:03

## 2025-03-27 RX ADMIN — SENNOSIDES AND DOCUSATE SODIUM 2 TABLET: 50; 8.6 TABLET ORAL at 09:03

## 2025-03-27 RX ADMIN — ACETAMINOPHEN 1000 MG: 500 TABLET ORAL at 09:03
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
## 2025-03-27 NOTE — PLAN OF CARE
Report received from RN.POC reviewed with the pt.RN assumed care.AAOX4.Room Air.All due medication administered according to the MAR.Observation reviewed and charted.Care explained,No falls or injury occurred during the shift.No any  significant event undergo in the shift. Pt rest in the bed comfortably. Flow sheets updated timely. Purposeful rounding done every 2 hourly. Daily Weight charted,IP/OP maintained and charted. Pt kept under observation through out the shift.      Problem: Adult Inpatient Plan of Care  Goal: Plan of Care Review  Outcome: Progressing  Goal: Patient-Specific Goal (Individualized)  Outcome: Progressing  Goal: Absence of Hospital-Acquired Illness or Injury  Outcome: Progressing  Goal: Optimal Comfort and Wellbeing  Outcome: Progressing  Goal: Readiness for Transition of Care  Outcome: Progressing     Problem: Fall Injury Risk  Goal: Absence of Fall and Fall-Related Injury  Outcome: Progressing     Problem: Skin Injury Risk Increased  Goal: Skin Health and Integrity  Outcome: Progressing

## 2025-03-27 NOTE — SUBJECTIVE & OBJECTIVE
Interval History: No overnight events. Working on placement. Delayed due to insurance issues.     Review of Systems   Constitutional:  Negative for appetite change and fever.   Respiratory:  Negative for cough and shortness of breath.    Cardiovascular:  Negative for chest pain and palpitations.   Gastrointestinal:  Negative for abdominal pain and nausea.   Musculoskeletal:  Positive for gait problem.   Skin:  Negative for color change and rash.   Neurological:  Negative for headaches.   Psychiatric/Behavioral:  Negative for agitation.      Objective:     Vital Signs (Most Recent):  Temp: 98.2 °F (36.8 °C) (03/27/25 1104)  Pulse: 62 (03/27/25 1104)  Resp: 17 (03/27/25 1104)  BP: (!) 109/56 (03/27/25 1104)  SpO2: 97 % (03/27/25 1104) Vital Signs (24h Range):  Temp:  [97.6 °F (36.4 °C)-98.3 °F (36.8 °C)] 98.2 °F (36.8 °C)  Pulse:  [56-69] 62  Resp:  [15-18] 17  SpO2:  [94 %-97 %] 97 %  BP: (101-111)/(53-62) 109/56     Weight: 84.6 kg (186 lb 8.2 oz)  Body mass index is 27.54 kg/m².    Intake/Output Summary (Last 24 hours) at 3/27/2025 1333  Last data filed at 3/27/2025 1225  Gross per 24 hour   Intake 1276 ml   Output 3400 ml   Net -2124 ml         Physical Exam  Constitutional:       General: She is not in acute distress.  Pulmonary:      Effort: No respiratory distress.      Breath sounds: No wheezing.   Abdominal:      General: There is no distension.      Tenderness: There is no abdominal tenderness.   Musculoskeletal:      Right lower leg: No edema.      Comments: Left knee in brace    Neurological:      General: No focal deficit present.      Mental Status: She is oriented to person, place, and time.               Significant Labs: All pertinent labs within the past 24 hours have been reviewed.    Significant Imaging: I have reviewed all pertinent imaging results/findings within the past 24 hours.

## 2025-03-27 NOTE — PT/OT/SLP PROGRESS
"Physical Therapy Treatment    Patient Name:  Anika Barajas   MRN:  [id number]    Recommendations:     Discharge Recommendations: High Intensity Therapy  Discharge Equipment Recommendations: to be determined by next level of care, walker, rolling, wheelchair, bedside commode (20" WC with L elevating leg rest, cushion, and anti-tippers)  Barriers to discharge: Inaccessible home and Decreased caregiver support    Assessment:     Anika Barajas is a 64 y.o. female admitted with a medical diagnosis of Tibial plateau fracture.  She presents with the following impairments/functional limitations: weakness, gait instability, pain, edema, orthopedic precautions, impaired balance, impaired functional mobility, impaired joint extensibility, impaired self care skills, impaired skin, decreased coordination, decreased lower extremity function, decreased ROM, decreased safety awareness, decreased upper extremity function.    Supine>sit with mod(I)  Sit>stand with RW and modA, LLE TTWB, cueing needed to adhere to precautions, pt tending to heel WB, continual cueing to use BUE to offload LLE  Bed>Chair with RW and modA, stand/pivot and assist/cueing to maintain WB precautions, pt tending to heel WB  NOTE: LLE with hinged knee brace in extension, RLE with pt's own ankle brace and shoe  Pt eager for OOB though having difficulty adhering to TTWB on LLE  Rec High Intensity therapy    Prior to admission pt was modified independent with mobility and self-care and there is expectation of returning to prior level of function to maintain independence avoiding readmission. Pt is at high risk of unplanned readmission due to fall risk and lack of 24 hour caregiver in prior setting. The lower level of care cannot provide total interdisciplinary approach needed. Pt is able to tolerate 3 hours of daily therapy. Pt is pleasant and motivated to return to prior level of function.      Rehab Prognosis: Good; patient would benefit from " acute skilled PT services to address these deficits and reach maximum level of function.    Recent Surgery: * No surgery found *      Plan:     During this hospitalization, patient to be seen 5 x/week to address the identified rehab impairments via gait training, therapeutic activities, therapeutic exercises, neuromuscular re-education, wheelchair management/training and progress toward the following goals:    Plan of Care Expires:  04/18/25    Subjective     Chief Complaint: I haven't heard anything about a SNF yet  Patient/Family Comments/goals: pt agreeable to therapy, happy to have a chair to sit in  Pain/Comfort:  Pain Rating 1: 0/10  Pain Rating Post-Intervention 1: 0/10      Objective:     Communicated with nurse Ramona prior to session.  Patient found HOB elevated with peripheral IV, PureWick, bed alarm (hinged knee brace (LLE) locked in extension) upon PT entry to room.     General Precautions: Standard, fall, seizure  Orthopedic Precautions: LLE toe touch weight bearing  Braces: Hinged knee brace  Respiratory Status: Room air     Functional Mobility:  Bed Mobility:     Supine to Sit: modified independence  Transfers:     Sit to Stand:  moderate assistance with rolling walker  Bed to Chair: moderate assistance with  rolling walker  using  Stand Pivot      AM-PAC 6 CLICK MOBILITY  Turning over in bed (including adjusting bedclothes, sheets and blankets)?: 3  Sitting down on and standing up from a chair with arms (e.g., wheelchair, bedside commode, etc.): 3  Moving from lying on back to sitting on the side of the bed?: 4  Moving to and from a bed to a chair (including a wheelchair)?: 2  Need to walk in hospital room?: 2  Climbing 3-5 steps with a railing?: 1  Basic Mobility Total Score: 15       Treatment & Education:  Supine therex BLE: AP, hip abd/add, SLR  Supine therex RLE: heel slides with resisted extension x 10  Transfer training as noted    Patient left up in chair with all lines intact, call button  in reach, restraints reapplied at end of session, and nurse Ramona notified..    GOALS:   Multidisciplinary Problems       Physical Therapy Goals          Problem: Physical Therapy    Goal Priority Disciplines Outcome Interventions   Physical Therapy Goal     PT, PT/OT Progressing    Description: Goals to be met by: 25    Patient will increase functional independence with mobility by performin. Supine<>sit with indep without use of HB features  2. Sit<>stand with mod(I) and RW.   3. Transfer bed<>WC with mod(I) and LRAD with most appropriate technique.  4. Gait  x 10 feet with RW and supervision maintaining TTWB of LLE.  5. Propel WC x300 ft with supervision including 180 deg turns and applying brakes without cueing.                        DME Justifications:  No DME recommended requiring DME justifications    Time Tracking:     PT Received On: 25  PT Start Time: 1439     PT Stop Time: 1502  PT Total Time (min): 23 min     Billable Minutes: Therapeutic Activity 13 and Therapeutic Exercise 10    Treatment Type: Treatment  PT/PTA: PTA     Number of PTA visits since last PT visit: 2025

## 2025-03-27 NOTE — PLAN OF CARE
NONA called [facility] and spoke with John in admissions to verify if they had spoke with patient's payee, Clarence. John said they have called and left a message and also emailed payee the requested documents needed--no return call or email. NONA to follow up with a call to paytito.

## 2025-03-28 PROCEDURE — 97542 WHEELCHAIR MNGMENT TRAINING: CPT | Mod: CQ

## 2025-03-28 PROCEDURE — 25000003 PHARM REV CODE 250: Performed by: NURSE PRACTITIONER

## 2025-03-28 PROCEDURE — 97535 SELF CARE MNGMENT TRAINING: CPT

## 2025-03-28 PROCEDURE — 25000003 PHARM REV CODE 250: Performed by: STUDENT IN AN ORGANIZED HEALTH CARE EDUCATION/TRAINING PROGRAM

## 2025-03-28 PROCEDURE — 25000003 PHARM REV CODE 250: Performed by: HOSPITALIST

## 2025-03-28 PROCEDURE — 97530 THERAPEUTIC ACTIVITIES: CPT | Mod: CQ

## 2025-03-28 PROCEDURE — 63600175 PHARM REV CODE 636 W HCPCS: Performed by: HOSPITALIST

## 2025-03-28 PROCEDURE — 11000001 HC ACUTE MED/SURG PRIVATE ROOM

## 2025-03-28 RX ADMIN — ENOXAPARIN SODIUM 40 MG: 40 INJECTION SUBCUTANEOUS at 05:03

## 2025-03-28 RX ADMIN — ACETAMINOPHEN 1000 MG: 500 TABLET ORAL at 02:03

## 2025-03-28 RX ADMIN — ACETAMINOPHEN 1000 MG: 500 TABLET ORAL at 05:03

## 2025-03-28 RX ADMIN — SENNOSIDES AND DOCUSATE SODIUM 2 TABLET: 50; 8.6 TABLET ORAL at 08:03

## 2025-03-28 RX ADMIN — OXYCODONE HYDROCHLORIDE AND ACETAMINOPHEN 1 TABLET: 7.5; 325 TABLET ORAL at 09:03

## 2025-03-28 RX ADMIN — ACETAMINOPHEN 1000 MG: 500 TABLET ORAL at 09:03

## 2025-03-28 RX ADMIN — POLYETHYLENE GLYCOL 3350 17 G: 17 POWDER, FOR SOLUTION ORAL at 08:03

## 2025-03-28 RX ADMIN — LAMOTRIGINE 200 MG: 100 TABLET ORAL at 08:03

## 2025-03-28 RX ADMIN — ATORVASTATIN CALCIUM 40 MG: 20 TABLET, FILM COATED ORAL at 08:03

## 2025-03-28 RX ADMIN — QUETIAPINE FUMARATE 200 MG: 200 TABLET ORAL at 09:03

## 2025-03-28 RX ADMIN — LEVOTHYROXINE SODIUM 112 MCG: 112 TABLET ORAL at 05:03

## 2025-03-28 RX ADMIN — QUETIAPINE FUMARATE 25 MG: 25 TABLET ORAL at 08:03

## 2025-03-28 NOTE — PLAN OF CARE
NONA called [facility] and spoke with John in admissions to follow up on patient admission. John stated they did reach out to patient sister, Esther, however they have not received a call back for Esther to be listed as the responsible party. NONA to follow up.     12:30pm  SW received call from [facility] and John stated that sister will be in on Monday to complete ppw for admission.

## 2025-03-28 NOTE — SUBJECTIVE & OBJECTIVE
Interval History: No overnight events. Working on placement. Delayed due to insurance issues.     Review of Systems   Constitutional:  Negative for appetite change and fever.   Respiratory:  Negative for cough and shortness of breath.    Cardiovascular:  Negative for chest pain and palpitations.   Gastrointestinal:  Negative for abdominal pain and nausea.   Musculoskeletal:  Positive for gait problem.   Skin:  Negative for color change and rash.   Neurological:  Negative for headaches.   Psychiatric/Behavioral:  Negative for agitation.      Objective:     Vital Signs (Most Recent):  Temp: 98.1 °F (36.7 °C) (03/28/25 1526)  Pulse: 64 (03/28/25 1526)  Resp: 18 (03/28/25 1526)  BP: (!) 97/59 (03/28/25 1526)  SpO2: 97 % (03/28/25 1526) Vital Signs (24h Range):  Temp:  [98.1 °F (36.7 °C)-98.3 °F (36.8 °C)] 98.1 °F (36.7 °C)  Pulse:  [57-69] 64  Resp:  [16-18] 18  SpO2:  [94 %-97 %] 97 %  BP: ()/(59-68) 97/59     Weight: 84.6 kg (186 lb 8.2 oz)  Body mass index is 27.54 kg/m².    Intake/Output Summary (Last 24 hours) at 3/28/2025 1542  Last data filed at 3/28/2025 1528  Gross per 24 hour   Intake --   Output 2600 ml   Net -2600 ml         Physical Exam  Constitutional:       General: She is not in acute distress.  Pulmonary:      Effort: No respiratory distress.      Breath sounds: No wheezing.   Abdominal:      General: There is no distension.      Tenderness: There is no abdominal tenderness.   Musculoskeletal:      Right lower leg: No edema.      Comments: Left knee in brace    Neurological:      General: No focal deficit present.      Mental Status: She is oriented to person, place, and time.               Significant Labs: All pertinent labs within the past 24 hours have been reviewed.    Significant Imaging: I have reviewed all pertinent imaging results/findings within the past 24 hours.
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
## 2025-03-28 NOTE — PT/OT/SLP PROGRESS
"Physical Therapy Treatment    Patient Name:  Anika Barajas   MRN:  [id number]    Recommendations:     Discharge Recommendations: High Intensity Therapy  Discharge Equipment Recommendations: to be determined by next level of care, walker, rolling, wheelchair, bedside commode (20" WC with L elevating leg rest, cushion, and anti-tippers)  Barriers to discharge: Inaccessible home and Decreased caregiver support    Assessment:     Anika Barajas is a 64 y.o. female admitted with a medical diagnosis of Tibial plateau fracture.  She presents with the following impairments/functional limitations: weakness, gait instability, orthopedic precautions, impaired balance, impaired endurance, impaired functional mobility, impaired joint extensibility, impaired self care skills, decreased coordination, decreased lower extremity function, decreased ROM, decreased safety awareness.    Supine>sit with mod(I)  Sit>stand with RW and modA from EOB, Hilario from WC  Bed>WC with modA, Stand/pivot, manual assist for LLE TTWB/NWB  Propelled WC x 200' with BUE and RLE, SPV  WC>Recliner chair with RW and Hilario + manual assist at LLE for TTWB/NWB  Pt gaining experience with stand/pivot transfers but continues to require assist for LLE WB precautions  Rec High Intensity Therapy    Prior to admission pt was modified independent with mobility and self-care and there is expectation of returning to prior level of function to maintain independence avoiding readmission. Pt is at high risk of unplanned readmission due to fall risk and lack of 24 hour caregiver in prior setting. The lower level of care cannot provide total interdisciplinary approach needed. Pt is able to tolerate 3 hours of daily therapy. Pt is pleasant and motivated to return to prior level of function.      Rehab Prognosis: Good; patient would benefit from acute skilled PT services to address these deficits and reach maximum level of function.    Recent Surgery: * No surgery " "found *      Plan:     During this hospitalization, patient to be seen 5 x/week to address the identified rehab impairments via gait training, therapeutic activities, therapeutic exercises, neuromuscular re-education, wheelchair management/training and progress toward the following goals:    Plan of Care Expires:  04/18/25    Subjective     Chief Complaint: pt upset about difficulty finding placement and not wanting to be "homeless"  Patient/Family Comments/goals: pt agreeable to therapy, wanting to take a spin in the WC  Pain/Comfort:  Pain Rating 1: 0/10  Pain Rating Post-Intervention 1: 0/10      Objective:     Communicated with  nursing prior to session.  Patient found HOB elevated with bed alarm, peripheral IV, PureWick upon PT entry to room.     General Precautions: Standard, fall, seizure  Orthopedic Precautions: LLE toe touch weight bearing  Braces: Hinged knee brace  Respiratory Status: Room air     Functional Mobility:  Bed Mobility:     Supine to Sit: modified independence  Transfers:     Sit to Stand:  minimum assistance and moderate assistance with rolling walker  Bed to Chair: moderate assistance with  rolling walker  using  Stand Pivot  Wheelchair Propulsion:  Pt propelled Standard wheelchair x 200 feet on Level tile with  Bilateral upper extremity and Right lower extremity with Supervision or Set-up Assistance.       AM-PAC 6 CLICK MOBILITY  Turning over in bed (including adjusting bedclothes, sheets and blankets)?: 3  Sitting down on and standing up from a chair with arms (e.g., wheelchair, bedside commode, etc.): 3  Moving from lying on back to sitting on the side of the bed?: 4  Moving to and from a bed to a chair (including a wheelchair)?: 2  Need to walk in hospital room?: 2  Climbing 3-5 steps with a railing?: 1  Basic Mobility Total Score: 15       Treatment & Education:  Transfers with RW as noted  Pt assisted with LLE for transfers to maintain TTWB/NWB precaution    Patient left up in chair " with all lines intact, call button in reach, and OT [place] notified..    GOALS:   Multidisciplinary Problems       Physical Therapy Goals          Problem: Physical Therapy    Goal Priority Disciplines Outcome Interventions   Physical Therapy Goal     PT, PT/OT Progressing    Description: Goals to be met by: 25    Patient will increase functional independence with mobility by performin. Supine<>sit with indep without use of HB features  2. Sit<>stand with mod(I) and RW.   3. Transfer bed<>WC with mod(I) and LRAD with most appropriate technique.  4. Gait  x 10 feet with RW and supervision maintaining TTWB of LLE.  5. Propel WC x300 ft with supervision including 180 deg turns and applying brakes without cueing.                        DME Justifications:   n/a    Time Tracking:     PT Received On: 25  PT Start Time: 1130     PT Stop Time: 1153  PT Total Time (min): 23 min     Billable Minutes: Therapeutic Activity 12 and Train/Wheelchair Management 11    Treatment Type: Treatment  PT/PTA: PTA     Number of PTA visits since last PT visit: 2     2025

## 2025-03-28 NOTE — PLAN OF CARE
Recommendations  - Boost Plus 1x daily to support nutrient intake   - Continue regular diet   - Monitor weight   - RD to monitor and follow up    Goals: Pt will continue to meet > 75% intake of meals by RD follow up  Nutrition Goal Status: new  Communication of RD Recs:  (POC)
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
## 2025-03-28 NOTE — PLAN OF CARE
NONA received message from CM director to return call to [facility], Chelsea Devyn, [phone] - for patient with the ACT team. NONA returned call and spoke with Chelsea so that the ACT team can follow patient for her psych services while inpatient at [facility]. NONA explained to Chelsea that patient had no Medicare part A benefits for skilled and patient medicaid benefits were only for behavioral health management services. Chelsea was very upset and asked why did the hospital apply for benefits so that patient could go into SNF or rehab instead of longterm placement because patient would forfeit her income and housing voucher. NONA explained that this was the only work around option for patient to receive therapy services since patient had no family to live with or any family to come live with patient temporarily.     Chelsea then spoke with NONA and CM director who explained situation once more and Chelsea stated she would come to patient bedside to call Medicare to see if patient could get Medicare part A. Chelsea stated her office received patient from [facility] and her office only ran benefits for behavioral health management services. NONA explained this was the best option for patient at this time. SW to follow up with [facility] for any additional ppw needed.

## 2025-03-28 NOTE — PT/OT/SLP PROGRESS
"Occupational Therapy   Treatment    Name: Anika Barajas  MRN: [id number]  Admitting Diagnosis:  Tibial plateau fracture       Recommendations:     Discharge Recommendations: High Intensity Therapy  Discharge Equipment Recommendations:  to be determined by next level of care, drop arm commode, walker, rolling, wheelchair, hospital bed (20" WC with L elevating leg rest, basic foam wheelchair cushion, and anti-tippers)  Barriers to discharge:  Inaccessible home environment, Decreased caregiver support (Current functional level)    Assessment:     Anika Barajas is a 64 y.o. female with a medical diagnosis of Tibial plateau fracture.  She presents with the following performance deficits affecting function: weakness, impaired endurance, impaired self care skills, impaired functional mobility, decreased safety awareness, impaired balance, gait instability, decreased lower extremity function, decreased coordination, impaired joint extensibility, orthopedic precautions, decreased ROM.     Rehab Prognosis:  Good; patient would benefit from acute skilled OT services to address these deficits and reach maximum level of function.       Plan:     Patient to be seen 5 x/week to address the above listed problems via self-care/home management, therapeutic activities, therapeutic exercises  Plan of Care Expires: 04/04/25  Plan of Care Reviewed with: patient    Subjective     Chief Complaint: None  Patient/Family Comments/goals: Pt reporting her sister brought her a night shirt today.  OT recommended pt wait to wear it until she goes to the next facility where there will be washing machines in case it gets wet from urine or gets soiled in any other way.  Pt stating she thinks that is a good idea and will wait to wear her new night shirt.  Pain/Comfort:  Pain Rating 1: 0/10    Objective:     Communicated with: nurse and PCT prior to session.  Patient found with peripheral IV (pt seated on BSC with left HKB locked in " extension) upon OT entry to room.    General Precautions: Standard, fall, seizure    Orthopedic Precautions:LLE toe touch weight bearing  Braces: Hinged knee brace  Respiratory Status: Room air     Occupational Performance:     Bed Mobility:    Sit to supine: CGA     Functional Mobility/Transfers:  Sit to stand: Min A with RW and assist to adhere to left TTWB  BSC to bed transfer: Mod A with RW, not able to fully adhere to TTWB to left LE, decreased safety awareness  Pt stating that she was educated earlier today on need to use wheelchair though moving towards being able to use RW more     Activities of Daily Living:  Toileting: Mod A using BSC, large BM       AMPAC 6 Click ADL: 16    Treatment & Education:  Role of OT, POC, ortho precautions, ADL and ADL mobility, correct fit of HKB, safety, discharge recs    Patient left HOB elevated with all lines intact, call button in reach, bed alarm on, and PCT present    GOALS:   Multidisciplinary Problems       Occupational Therapy Goals          Problem: Occupational Therapy    Goal Priority Disciplines Outcome Interventions   Occupational Therapy Goal     OT, PT/OT Progressing    Description: Goals to be met by: 4/4/2025     Patient will increase functional independence with ADLs by performing:    UE Dressing with Supervision.  LE Dressing with Minimal Assistance.  Grooming while EOB with Set-up Assistance.  Toilet transfer to bedside commode with Contact Guard Assistance.                         DME Justifications:   Anika Bolton requires a commode for home use because she is confined to a single room.  Anika Barajas has a mobility limitation that significantly impairs her ability to participate in one or more mobility related activities of daily living (MRADLs) such as toileting, feeding, dressing, grooming, and bathing in customary locations in the home.  The mobility limitation cannot be sufficiently resolved by the use of a cane or walker.   The use of a  manual wheelchair will significantly improve the patients ability to participate in MRADLS and the patient will use it on regular basis in the home.  Anika Barajas has expressed her willingness to use a manual wheelchair in the home. Patients upper body strength is sufficient for propulsion.  She also has a caregiver who is available, willing, and able to provide assistance with the wheelchair when needed.     Anika Bolton's mobility limitation cannot be sufficiently resolved by the use of a cane. Her functional mobility deficit can be sufficiently resolved with the use of a Rolling Walker. Patient's mobility limitation significantly impairs their ability to participate in one of more activities of daily living.  The use of a RW will significantly improve the patient's ability to participate in MRADLS and the patient will use it on regular basis in the home.    Time Tracking:     OT Date of Treatment: 03/28/25  OT Start Time: 1638  OT Stop Time: 1659  OT Total Time (min): 21 min    Billable Minutes:Self Care/Home Management 21    OT/ADY: OT          3/28/2025

## 2025-03-28 NOTE — PLAN OF CARE
Problem: Adult Inpatient Plan of Care  Goal: Plan of Care Review  Outcome: Progressing     Problem: Adult Inpatient Plan of Care  Goal: Patient-Specific Goal (Individualized)  Outcome: Progressing     Problem: Adult Inpatient Plan of Care  Goal: Readiness for Transition of Care  Outcome: Progressing     Problem: Fall Injury Risk  Goal: Absence of Fall and Fall-Related Injury  Outcome: Progressing     Problem: Skin Injury Risk Increased  Goal: Skin Health and Integrity  Outcome: Progressing

## 2025-03-29 PROCEDURE — 25000003 PHARM REV CODE 250: Performed by: STUDENT IN AN ORGANIZED HEALTH CARE EDUCATION/TRAINING PROGRAM

## 2025-03-29 PROCEDURE — 25000003 PHARM REV CODE 250: Performed by: HOSPITALIST

## 2025-03-29 PROCEDURE — 63600175 PHARM REV CODE 636 W HCPCS: Performed by: HOSPITALIST

## 2025-03-29 PROCEDURE — 25000003 PHARM REV CODE 250: Performed by: NURSE PRACTITIONER

## 2025-03-29 PROCEDURE — 97530 THERAPEUTIC ACTIVITIES: CPT | Mod: CQ

## 2025-03-29 PROCEDURE — 11000001 HC ACUTE MED/SURG PRIVATE ROOM

## 2025-03-29 PROCEDURE — 94761 N-INVAS EAR/PLS OXIMETRY MLT: CPT

## 2025-03-29 RX ADMIN — ATORVASTATIN CALCIUM 40 MG: 20 TABLET, FILM COATED ORAL at 08:03

## 2025-03-29 RX ADMIN — LEVOTHYROXINE SODIUM 112 MCG: 112 TABLET ORAL at 05:03

## 2025-03-29 RX ADMIN — SENNOSIDES AND DOCUSATE SODIUM 1 TABLET: 50; 8.6 TABLET ORAL at 08:03

## 2025-03-29 RX ADMIN — ACETAMINOPHEN 1000 MG: 500 TABLET ORAL at 04:03

## 2025-03-29 RX ADMIN — QUETIAPINE FUMARATE 25 MG: 25 TABLET ORAL at 08:03

## 2025-03-29 RX ADMIN — LAMOTRIGINE 200 MG: 100 TABLET ORAL at 08:03

## 2025-03-29 RX ADMIN — QUETIAPINE FUMARATE 200 MG: 200 TABLET ORAL at 10:03

## 2025-03-29 RX ADMIN — ACETAMINOPHEN 1000 MG: 500 TABLET ORAL at 05:03

## 2025-03-29 RX ADMIN — ACETAMINOPHEN 1000 MG: 500 TABLET ORAL at 10:03

## 2025-03-29 RX ADMIN — ENOXAPARIN SODIUM 40 MG: 40 INJECTION SUBCUTANEOUS at 04:03

## 2025-03-29 NOTE — PROGRESS NOTES
Pt's BP dropped after receiving her Percocet and nightly Seroquel. 92/50, MAP 66. Rechecked and 89/52, MAP 66. Pt sleeping between care, asymptomatic. Upon recheck, /68. CHADWICK Marie aware.

## 2025-03-29 NOTE — PLAN OF CARE
Pt remained free of falls and injuries. AAOx4. Pt calm and cooperative. Purposeful hourly rounding performed. Pt swallows meds whole. IV flushed and saline locked. Managing L knee pain with scheduled Tylenol and PRN Percocet. LLE hinge brace maintained. Pure wick in place to suction. Frequent weight shifting encouraged, weight shift assistance provided. VSS on RA. Pt sleeping in bed, even rise and fall of chest. Bed low and locked, bed alarm on, call light in reach. Side rails up x3. Will continue to monitor.

## 2025-03-29 NOTE — SUBJECTIVE & OBJECTIVE
Interval History: No overnight events. Working on placement. Delayed due to insurance issues and weekend.     Review of Systems   Constitutional:  Negative for appetite change and fever.   Respiratory:  Negative for cough and shortness of breath.    Cardiovascular:  Negative for chest pain and palpitations.   Gastrointestinal:  Negative for abdominal pain and nausea.   Musculoskeletal:  Positive for gait problem.   Skin:  Negative for color change and rash.   Neurological:  Negative for headaches.   Psychiatric/Behavioral:  Negative for agitation.      Objective:     Vital Signs (Most Recent):  Temp: 98.1 °F (36.7 °C) (03/29/25 0732)  Pulse: (!) 59 (03/29/25 0733)  Resp: 18 (03/29/25 0732)  BP: 125/74 (03/29/25 0733)  SpO2: 100 % (03/29/25 0732) Vital Signs (24h Range):  Temp:  [97.9 °F (36.6 °C)-98.4 °F (36.9 °C)] 98.1 °F (36.7 °C)  Pulse:  [56-69] 59  Resp:  [16-18] 18  SpO2:  [94 %-100 %] 100 %  BP: ()/(50-74) 125/74     Weight: 84.6 kg (186 lb 8.2 oz)  Body mass index is 27.54 kg/m².    Intake/Output Summary (Last 24 hours) at 3/29/2025 0959  Last data filed at 3/29/2025 0707  Gross per 24 hour   Intake 360 ml   Output 2000 ml   Net -1640 ml         Physical Exam  Constitutional:       General: She is not in acute distress.  Pulmonary:      Effort: No respiratory distress.      Breath sounds: No wheezing.   Abdominal:      General: There is no distension.      Tenderness: There is no abdominal tenderness.   Musculoskeletal:      Right lower leg: No edema.      Comments: Left knee in brace    Neurological:      General: No focal deficit present.      Mental Status: She is oriented to person, place, and time.               Significant Labs: All pertinent labs within the past 24 hours have been reviewed.    Significant Imaging: I have reviewed all pertinent imaging results/findings within the past 24 hours.

## 2025-03-29 NOTE — PLAN OF CARE
Problem: Adult Inpatient Plan of Care  Goal: Plan of Care Review  Outcome: Progressing  Goal: Patient-Specific Goal (Individualized)  Outcome: Progressing  Goal: Absence of Hospital-Acquired Illness or Injury  Outcome: Progressing  Goal: Optimal Comfort and Wellbeing  Outcome: Progressing  Goal: Readiness for Transition of Care  Outcome: Progressing     Problem: Fall Injury Risk  Goal: Absence of Fall and Fall-Related Injury  Outcome: Progressing     Problem: Skin Injury Risk Increased  Goal: Skin Health and Integrity  Outcome: Progressing      numerical 0-10

## 2025-03-29 NOTE — PT/OT/SLP PROGRESS
"Physical Therapy Treatment    Patient Name:  Anika Barajas   MRN:  [id number]    Recommendations:     Discharge Recommendations: High Intensity Therapy  Discharge Equipment Recommendations: to be determined by next level of care, walker, rolling, wheelchair, bedside commode (20" WC with L elevating leg rest, cushion, and anti-tippers)  Barriers to discharge: Inaccessible home and Decreased caregiver support    Assessment:     Anika Barajas is a 64 y.o. female admitted with a medical diagnosis of Tibial plateau fracture.  She presents with the following impairments/functional limitations: weakness, gait instability, orthopedic precautions, impaired balance, impaired endurance, impaired functional mobility, impaired self care skills, impaired joint extensibility, decreased coordination, decreased lower extremity function, decreased ROM, decreased safety awareness.    Supine<>sit with mod(I)  Sit>stand with RW and Hilario, LLE TTWB  Static stand x 60 secs with RW and Hilario, LLE TTWB  Amb 3 lateral steps to left along EOB with RW and Hilario, cueing for continued adherence to WB precautions  Scoot/bridge toward HOB with SBA and cueing for use of BUE and RLE  Pt in good spirits, performed stand with TTWB on LLE, improved balance  Rec High Intensity therapy    Prior to admission pt was modified independent with mobility and self-care and there is expectation of returning to prior level of function to maintain independence avoiding readmission. Pt is at high risk of unplanned readmission due to fall risk and lack of 24 hour caregiver in prior setting. The lower level of care cannot provide total interdisciplinary approach needed. Pt is able to tolerate 3 hours of daily therapy. Pt is pleasant and motivated to return to prior level of function.      Rehab Prognosis: Good; patient would benefit from acute skilled PT services to address these deficits and reach maximum level of function.    Recent Surgery: * No " surgery found *      Plan:     During this hospitalization, patient to be seen 5 x/week to address the identified rehab impairments via gait training, therapeutic activities, therapeutic exercises, neuromuscular re-education, wheelchair management/training and progress toward the following goals:    Plan of Care Expires:  04/18/25    Subjective     Chief Complaint: I figure therapy might not come by on the weekends  Patient/Family Comments/goals: agreeable to therapy  Pain/Comfort:  Pain Rating 1: 0/10  Pain Rating Post-Intervention 1: 0/10      Objective:     Communicated with nurse Gee prior to session.  Patient found HOB elevated with peripheral IV, PureWick upon PT entry to room.     General Precautions: Standard, fall, seizure  Orthopedic Precautions: LLE toe touch weight bearing  Braces: Hinged knee brace  Respiratory Status: Room air     Functional Mobility:  Bed Mobility:     Scooting: stand by assistance  Supine to Sit: modified independence  Sit to Supine: modified independence  Transfers:     Sit to Stand:  minimum assistance with rolling walker  Gait: 3 lateral steps to left along EOB with RW and Hilario, cueing for continued adherence to WB precautions      AM-PAC 6 CLICK MOBILITY  Turning over in bed (including adjusting bedclothes, sheets and blankets)?: 3  Sitting down on and standing up from a chair with arms (e.g., wheelchair, bedside commode, etc.): 3  Moving from lying on back to sitting on the side of the bed?: 4  Moving to and from a bed to a chair (including a wheelchair)?: 2  Need to walk in hospital room?: 2  Climbing 3-5 steps with a railing?: 1  Basic Mobility Total Score: 15       Treatment & Education:  Static stand x 60 secs with RW and Hilario, LLE TTWB    Supine therex BLE : AP, hip abd/add, SLR (LLE only) x 10; single-leg bridge x 6    Patient left HOB elevated with all lines intact, call button in reach, and bed alarm on..    GOALS:   Multidisciplinary Problems       Physical Therapy  Goals          Problem: Physical Therapy    Goal Priority Disciplines Outcome Interventions   Physical Therapy Goal     PT, PT/OT Progressing    Description: Goals to be met by: 25    Patient will increase functional independence with mobility by performin. Supine<>sit with indep without use of HB features  2. Sit<>stand with mod(I) and RW.   3. Transfer bed<>WC with mod(I) and LRAD with most appropriate technique.  4. Gait  x 10 feet with RW and supervision maintaining TTWB of LLE.  5. Propel WC x300 ft with supervision including 180 deg turns and applying brakes without cueing.                        DME Justifications:  No DME recommended requiring DME justifications    Time Tracking:     PT Received On: 25  PT Start Time: 1050     PT Stop Time: 1104  PT Total Time (min): 14 min     Billable Minutes: Therapeutic Activity 14    Treatment Type: Treatment  PT/PTA: PTA     Number of PTA visits since last PT visit: 3     2025   baseline
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
## 2025-03-30 PROCEDURE — 25000003 PHARM REV CODE 250: Performed by: STUDENT IN AN ORGANIZED HEALTH CARE EDUCATION/TRAINING PROGRAM

## 2025-03-30 PROCEDURE — 94761 N-INVAS EAR/PLS OXIMETRY MLT: CPT

## 2025-03-30 PROCEDURE — 25000003 PHARM REV CODE 250: Performed by: HOSPITALIST

## 2025-03-30 PROCEDURE — 11000001 HC ACUTE MED/SURG PRIVATE ROOM

## 2025-03-30 PROCEDURE — 25000003 PHARM REV CODE 250: Performed by: NURSE PRACTITIONER

## 2025-03-30 PROCEDURE — 63600175 PHARM REV CODE 636 W HCPCS: Performed by: HOSPITALIST

## 2025-03-30 RX ADMIN — LAMOTRIGINE 200 MG: 100 TABLET ORAL at 08:03

## 2025-03-30 RX ADMIN — ATORVASTATIN CALCIUM 40 MG: 20 TABLET, FILM COATED ORAL at 08:03

## 2025-03-30 RX ADMIN — ACETAMINOPHEN 1000 MG: 500 TABLET ORAL at 04:03

## 2025-03-30 RX ADMIN — QUETIAPINE FUMARATE 25 MG: 25 TABLET ORAL at 08:03

## 2025-03-30 RX ADMIN — LEVOTHYROXINE SODIUM 112 MCG: 112 TABLET ORAL at 05:03

## 2025-03-30 RX ADMIN — ENOXAPARIN SODIUM 40 MG: 40 INJECTION SUBCUTANEOUS at 04:03

## 2025-03-30 RX ADMIN — ACETAMINOPHEN 1000 MG: 500 TABLET ORAL at 09:03

## 2025-03-30 RX ADMIN — ACETAMINOPHEN 1000 MG: 500 TABLET ORAL at 05:03

## 2025-03-30 RX ADMIN — QUETIAPINE FUMARATE 200 MG: 200 TABLET ORAL at 09:03

## 2025-03-30 NOTE — PLAN OF CARE
Pt remained free of falls and injuries. AAOx4. Pt calm and cooperative. Purposeful hourly rounding performed. Pt swallows meds whole. IV flushed and saline locked. Scheduled Tylenol given for L knee pain. LLE hinge brace maintained. Pure wick in place to suction. Frequent weight shifting encouraged, weight shift assistance provided. Pt's BP 95/53, MAP 68 this AM. Pt asymptomatic and sleeping. VSS on RA. Pt sleeping in bed, even rise and fall of chest. Bed low and locked, bed alarm on, call light in reach. Side rails up x3. Will continue to monitor.
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
## 2025-03-30 NOTE — SUBJECTIVE & OBJECTIVE
Interval History: No overnight events. Working on placement. Delayed due to insurance issues and weekend.     Review of Systems   Constitutional:  Negative for appetite change and fever.   Respiratory:  Negative for cough and shortness of breath.    Cardiovascular:  Negative for chest pain and palpitations.   Gastrointestinal:  Negative for abdominal pain and nausea.   Musculoskeletal:  Positive for gait problem.   Skin:  Negative for color change and rash.   Neurological:  Negative for headaches.   Psychiatric/Behavioral:  Negative for agitation.      Objective:     Vital Signs (Most Recent):  Temp: 98.3 °F (36.8 °C) (03/30/25 0725)  Pulse: (!) 58 (03/30/25 0725)  Resp: 18 (03/30/25 0725)  BP: 117/81 (03/30/25 0725)  SpO2: 97 % (03/30/25 0725) Vital Signs (24h Range):  Temp:  [97.8 °F (36.6 °C)-98.7 °F (37.1 °C)] 98.3 °F (36.8 °C)  Pulse:  [58-74] 58  Resp:  [16-20] 18  SpO2:  [94 %-97 %] 97 %  BP: ()/(53-81) 117/81     Weight: 84.6 kg (186 lb 8.2 oz)  Body mass index is 27.54 kg/m².    Intake/Output Summary (Last 24 hours) at 3/30/2025 1106  Last data filed at 3/30/2025 0926  Gross per 24 hour   Intake 300 ml   Output 2050 ml   Net -1750 ml         Physical Exam  Constitutional:       General: She is not in acute distress.  Pulmonary:      Effort: No respiratory distress.      Breath sounds: No wheezing.   Abdominal:      General: There is no distension.      Tenderness: There is no abdominal tenderness.   Musculoskeletal:      Right lower leg: No edema.      Comments: Left knee in brace    Neurological:      General: No focal deficit present.      Mental Status: She is oriented to person, place, and time.               Significant Labs: All pertinent labs within the past 24 hours have been reviewed.    Significant Imaging: I have reviewed all pertinent imaging results/findings within the past 24 hours.

## 2025-03-30 NOTE — PLAN OF CARE
Patient had no complaints of pain or discomfort, only wanted to take scheduled tylenol. Patient had a bowel movement and was able to ambulate to the commode with minimal assistance of 2 people.   Problem: Adult Inpatient Plan of Care  Goal: Plan of Care Review  Outcome: Progressing  Goal: Patient-Specific Goal (Individualized)  Outcome: Progressing  Goal: Absence of Hospital-Acquired Illness or Injury  Outcome: Progressing  Goal: Optimal Comfort and Wellbeing  Outcome: Progressing  Goal: Readiness for Transition of Care  Outcome: Progressing     Problem: Fall Injury Risk  Goal: Absence of Fall and Fall-Related Injury  Outcome: Progressing     Problem: Skin Injury Risk Increased  Goal: Skin Health and Integrity  Outcome: Progressing

## 2025-03-30 NOTE — ASSESSMENT & PLAN NOTE
CT- Acute depressed fracture of the medial tibial plateau. Subtle nondisplaced fracture of the proximal fibula.    Consult Ortho  Scheduled tylenol and Percocet p.r.n. for pain    Ortho recs:   Order hinge knee brace and consult PT. Non-operative treatment at this time. Continue with current pain regimen.     PT/OT - SNF

## 2025-03-30 NOTE — PROGRESS NOTES
When checking midnight VS, VS machine having issues with BP cable leaking air and kept restarting. Secured BP cable attachment to resolve and restarted, pt screaming loudly that it's too tight on her arm and refusing to allow a different machine to check her BP. Pt said her arm hurts and she wants to go to sleep. Apologized and consoled pt. Will attempt to recheck in a few hours. Other VSS at this time.

## 2025-03-31 PROCEDURE — 25000003 PHARM REV CODE 250: Performed by: HOSPITALIST

## 2025-03-31 PROCEDURE — 25000003 PHARM REV CODE 250: Performed by: NURSE PRACTITIONER

## 2025-03-31 PROCEDURE — 25000003 PHARM REV CODE 250: Performed by: STUDENT IN AN ORGANIZED HEALTH CARE EDUCATION/TRAINING PROGRAM

## 2025-03-31 PROCEDURE — 97535 SELF CARE MNGMENT TRAINING: CPT

## 2025-03-31 PROCEDURE — 97116 GAIT TRAINING THERAPY: CPT | Mod: CQ

## 2025-03-31 PROCEDURE — 97110 THERAPEUTIC EXERCISES: CPT | Mod: CQ

## 2025-03-31 PROCEDURE — 94761 N-INVAS EAR/PLS OXIMETRY MLT: CPT

## 2025-03-31 PROCEDURE — 11000001 HC ACUTE MED/SURG PRIVATE ROOM

## 2025-03-31 PROCEDURE — 63600175 PHARM REV CODE 636 W HCPCS: Performed by: HOSPITALIST

## 2025-03-31 RX ADMIN — SENNOSIDES AND DOCUSATE SODIUM 2 TABLET: 50; 8.6 TABLET ORAL at 08:03

## 2025-03-31 RX ADMIN — ATORVASTATIN CALCIUM 40 MG: 20 TABLET, FILM COATED ORAL at 08:03

## 2025-03-31 RX ADMIN — MELATONIN TAB 3 MG 6 MG: 3 TAB at 08:03

## 2025-03-31 RX ADMIN — ACETAMINOPHEN 1000 MG: 500 TABLET ORAL at 06:03

## 2025-03-31 RX ADMIN — POLYETHYLENE GLYCOL 3350 17 G: 17 POWDER, FOR SOLUTION ORAL at 08:03

## 2025-03-31 RX ADMIN — ENOXAPARIN SODIUM 40 MG: 40 INJECTION SUBCUTANEOUS at 04:03

## 2025-03-31 RX ADMIN — OXYCODONE HYDROCHLORIDE AND ACETAMINOPHEN 1 TABLET: 7.5; 325 TABLET ORAL at 08:03

## 2025-03-31 RX ADMIN — QUETIAPINE FUMARATE 200 MG: 200 TABLET ORAL at 08:03

## 2025-03-31 RX ADMIN — ACETAMINOPHEN 1000 MG: 500 TABLET ORAL at 09:03

## 2025-03-31 RX ADMIN — QUETIAPINE FUMARATE 25 MG: 25 TABLET ORAL at 08:03

## 2025-03-31 RX ADMIN — ACETAMINOPHEN 1000 MG: 500 TABLET ORAL at 01:03

## 2025-03-31 RX ADMIN — LEVOTHYROXINE SODIUM 112 MCG: 112 TABLET ORAL at 06:03

## 2025-03-31 RX ADMIN — LAMOTRIGINE 200 MG: 100 TABLET ORAL at 08:03
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
## 2025-03-31 NOTE — PLAN OF CARE
NONA received call from John in admissions at [facility] stating that they have to retract acceptance due to patient sister refusing to sign paperwork. Patient sister stated she is not signing paperwork for patient to be admitted into nursing home care. Patient sister left [facility] and stated she would come to hospital to speak with NONA.

## 2025-03-31 NOTE — PLAN OF CARE
NONA met with patient sister, Esther at bedside. Patient sister was not happy about patient going into shelter care, however, after explaining to Esther the insurance situation, Esther understood.     After extensive conversation with patient and patient sister, CM director, Shelli came to room to join in conversation. Ultimately, patient sister is agreeable to sign paperwork at [facility].     [facility] called [facility] and spoke with Madhu in admissions to inform that sister would be there around 12 noon tomorrow to sign paperwork. Madhu stated that patient would most likely have to be re-reviewed by  for acceptance and that she would call SW back with information. SW to follow up.

## 2025-03-31 NOTE — PLAN OF CARE
Plan of care reviewed with patient. Patient without needs at this time. Will note any changes as they occur.       Problem: Adult Inpatient Plan of Care  Goal: Plan of Care Review  Outcome: Progressing  Goal: Patient-Specific Goal (Individualized)  Outcome: Progressing  Goal: Absence of Hospital-Acquired Illness or Injury  Outcome: Progressing  Goal: Optimal Comfort and Wellbeing  Outcome: Progressing  Goal: Readiness for Transition of Care  Outcome: Progressing     Problem: Fall Injury Risk  Goal: Absence of Fall and Fall-Related Injury  Outcome: Progressing     Problem: Skin Injury Risk Increased  Goal: Skin Health and Integrity  Outcome: Progressing

## 2025-03-31 NOTE — SUBJECTIVE & OBJECTIVE
Interval History: No overnight events. Working on placement. Delayed due to insurance issues and weekend.     Review of Systems   Constitutional:  Negative for appetite change and fever.   Respiratory:  Negative for cough and shortness of breath.    Cardiovascular:  Negative for chest pain and palpitations.   Gastrointestinal:  Negative for abdominal pain and nausea.   Musculoskeletal:  Positive for gait problem.   Skin:  Negative for color change and rash.   Neurological:  Negative for headaches.   Psychiatric/Behavioral:  Negative for agitation.      Objective:     Vital Signs (Most Recent):  Temp: 97.9 °F (36.6 °C) (03/31/25 1518)  Pulse: 66 (03/31/25 1518)  Resp: 18 (03/31/25 1518)  BP: (!) 116/59 (03/31/25 1518)  SpO2: 97 % (03/31/25 1518) Vital Signs (24h Range):  Temp:  [97.8 °F (36.6 °C)-98.7 °F (37.1 °C)] 97.9 °F (36.6 °C)  Pulse:  [57-71] 66  Resp:  [18] 18  SpO2:  [94 %-97 %] 97 %  BP: ()/(54-69) 116/59     Weight: 84.6 kg (186 lb 8.2 oz)  Body mass index is 27.54 kg/m².    Intake/Output Summary (Last 24 hours) at 3/31/2025 1519  Last data filed at 3/31/2025 1319  Gross per 24 hour   Intake 720 ml   Output 3200 ml   Net -2480 ml         Physical Exam  Constitutional:       General: She is not in acute distress.  Pulmonary:      Effort: No respiratory distress.      Breath sounds: No wheezing.   Abdominal:      General: There is no distension.      Tenderness: There is no abdominal tenderness.   Musculoskeletal:      Right lower leg: No edema.      Comments: Left knee in brace    Neurological:      General: No focal deficit present.      Mental Status: She is oriented to person, place, and time.               Significant Labs: All pertinent labs within the past 24 hours have been reviewed.    Significant Imaging: I have reviewed all pertinent imaging results/findings within the past 24 hours.

## 2025-03-31 NOTE — PT/OT/SLP PROGRESS
"Physical Therapy Treatment    Patient Name:  Anika Barajas   MRN:  [id number]    Recommendations:     Discharge Recommendations: High Intensity Therapy  Discharge Equipment Recommendations: to be determined by next level of care, walker, rolling, wheelchair, bedside commode (20" WC with L elevating leg rest, cushion, and anti-tippers)  Barriers to discharge: Inaccessible home and Decreased caregiver support    Assessment:     Anika Barajas is a 64 y.o. female admitted with a medical diagnosis of Tibial plateau fracture.  She presents with the following impairments/functional limitations: weakness, gait instability, orthopedic precautions, impaired balance, impaired endurance, impaired functional mobility, impaired self care skills, impaired joint extensibility, decreased coordination, decreased lower extremity function, decreased ROM, decreased safety awareness.    Supine>sit with mod(I)  Sit>stand from EOB with RW and Hilario, LLE TTWB  Amb 20' with RW and Hilario, LLE TTWB, some cueing needed for improved adherence to precautions and for safety with turns, R ankle brace and shoe donned, L hinged knee brace donned (locked in extension)  Pt progressing and showing improved adherence to TTWB precautions with overall increased stability; assist still needed for mgt of hinged knee brace and donning/doffing of R ankle braces and shoe  Rec High Intensity therapy    Prior to admission pt was modified independent with mobility and self-care and there is expectation of returning to prior level of function to maintain independence avoiding readmission. Pt is at high risk of unplanned readmission due to fall risk and lack of 24 hour caregiver in prior setting. The lower level of care cannot provide total interdisciplinary approach needed. Pt is able to tolerate 3 hours of daily therapy. Pt is pleasant and motivated to return to prior level of function.     Rehab Prognosis: Good; patient would benefit from acute " skilled PT services to address these deficits and reach maximum level of function.    Recent Surgery: * No surgery found *      Plan:     During this hospitalization, patient to be seen 5 x/week to address the identified rehab impairments via gait training, therapeutic activities, therapeutic exercises, neuromuscular re-education, wheelchair management/training and progress toward the following goals:    Plan of Care Expires:  04/18/25    Subjective     Chief Complaint: I need Rehad! I want to walk again and have my life back. I don't want to be in a nursing home and bed bound!  Patient/Family Comments/goals: pt agreeable to therapy  Pain/Comfort:  Pain Rating 1: 0/10  Pain Rating Post-Intervention 1: 0/10      Objective:     Communicated with nurse Sherwood prior to session.  Patient found HOB elevated with peripheral IV, PureWick upon PT entry to room.     General Precautions: Standard, fall, seizure  Orthopedic Precautions: LLE toe touch weight bearing  Braces: Hinged knee brace  Respiratory Status: Room air     Functional Mobility:  Bed Mobility:     Supine to Sit: modified independence  Transfers:     Sit to Stand:  minimum assistance with rolling walker  Gait: 20' with RW and Hilario, LLE TTWB, some cueing needed for improved adherence to precautions and for safety with turns, R ankle brace and shoe donned, L hinged knee brace donned (locked in extension)      AM-PAC 6 CLICK MOBILITY  Turning over in bed (including adjusting bedclothes, sheets and blankets)?: 3  Sitting down on and standing up from a chair with arms (e.g., wheelchair, bedside commode, etc.): 3  Moving from lying on back to sitting on the side of the bed?: 4  Moving to and from a bed to a chair (including a wheelchair)?: 2  Need to walk in hospital room?: 2  Climbing 3-5 steps with a railing?: 1  Basic Mobility Total Score: 15       Treatment & Education:  Reclined therex BLE: AP, hip abd/add x 10  LLE: SLR x 10  Gait training as noted  Pt assisted  with adjusting hinged knee brace and donning R ankle brace/shoe    Patient left up in chair with all lines intact and call button in reach..    GOALS:   Multidisciplinary Problems       Physical Therapy Goals          Problem: Physical Therapy    Goal Priority Disciplines Outcome Interventions   Physical Therapy Goal     PT, PT/OT Progressing    Description: Goals to be met by: 25    Patient will increase functional independence with mobility by performin. Supine<>sit with indep without use of HB features  2. Sit<>stand with mod(I) and RW.   3. Transfer bed<>WC with mod(I) and LRAD with most appropriate technique.  4. Gait  x 10 feet with RW and supervision maintaining TTWB of LLE.  5. Propel WC x300 ft with supervision including 180 deg turns and applying brakes without cueing.                        DME Justifications:   Anika Bolton requires a commode for home use because she is confined to a single room.  Anika Barajas has a mobility limitation that significantly impairs her ability to participate in one or more mobility related activities of daily living (MRADLs) such as toileting, feeding, dressing, grooming, and bathing in customary locations in the home.  The mobility limitation cannot be sufficiently resolved by the use of a cane or walker.   The use of a manual wheelchair will significantly improve the patients ability to participate in MRADLS and the patient will use it on regular basis in the home.  Anika Barajas has expressed her willingness to use a manual wheelchair in the home. Patients upper body strength is sufficient for propulsion.  She also has a caregiver who is available, willing, and able to provide assistance with the wheelchair when needed.     Anika Bolton's mobility limitation cannot be sufficiently resolved by the use of a cane. Her functional mobility deficit can be sufficiently resolved with the use of a Rolling Walker. Patient's mobility limitation  significantly impairs their ability to participate in one of more activities of daily living.  The use of a RW will significantly improve the patient's ability to participate in MRADLS and the patient will use it on regular basis in the home.    Time Tracking:     PT Received On: 03/31/25  PT Start Time: 1415     PT Stop Time: 1440  PT Total Time (min): 25 min     Billable Minutes: Gait Training 15 and Therapeutic Exercise 10    Treatment Type: Treatment  PT/PTA: PTA     Number of PTA visits since last PT visit: 4     03/31/2025

## 2025-03-31 NOTE — PLAN OF CARE
03/31/25 1304   Discharge Reassessment   Assessment Type Discharge Planning Reassessment   Did the patient's condition or plan change since previous assessment? No   Discharge Plan discussed with: Patient   Communicated PARMJIT with patient/caregiver Date not available/Unable to determine   Discharge Plan A New Nursing Home placement - senior living care facility   Discharge Plan B Home Health;Home   DME Needed Upon Discharge  none   Transition of Care Barriers None   Why the patient remains in the hospital Placement issues   Post-Acute Status   Post-Acute Authorization Placement   Post-Acute Placement Status Pending payor review/awaiting authorization (if required)   Discharge Delays (!) Post-Acute Set-up     NONA spoke with John in admissions at [facility]--patient has been clinically and financially accepted, however, patient sister coming today between 2:30pm and 3:00pm to complete paperwork. SW asked can patient come today--John stated that patient cannot admit due to patient family coming so late in day to complete ppw and too many admissions.
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
## 2025-04-01 LAB
ABSOLUTE EOSINOPHIL (OHS): 0 K/UL
ABSOLUTE MONOCYTE (OHS): 0.66 K/UL (ref 0.3–1)
ABSOLUTE NEUTROPHIL COUNT (OHS): 5.45 K/UL (ref 1.8–7.7)
ANION GAP (OHS): 9 MMOL/L (ref 8–16)
BASOPHILS # BLD AUTO: 0.04 K/UL
BASOPHILS NFR BLD AUTO: 0.5 %
BUN SERPL-MCNC: 23 MG/DL (ref 8–23)
CALCIUM SERPL-MCNC: 8.4 MG/DL (ref 8.7–10.5)
CHLORIDE SERPL-SCNC: 107 MMOL/L (ref 95–110)
CO2 SERPL-SCNC: 21 MMOL/L (ref 23–29)
CREAT SERPL-MCNC: 0.7 MG/DL (ref 0.5–1.4)
ERYTHROCYTE [DISTWIDTH] IN BLOOD BY AUTOMATED COUNT: 14.9 % (ref 11.5–14.5)
GFR SERPLBLD CREATININE-BSD FMLA CKD-EPI: >60 ML/MIN/1.73/M2
GLUCOSE SERPL-MCNC: 103 MG/DL (ref 70–110)
HCT VFR BLD AUTO: 31.2 % (ref 37–48.5)
HGB BLD-MCNC: 10 GM/DL (ref 12–16)
IMM GRANULOCYTES # BLD AUTO: 0.05 K/UL (ref 0–0.04)
IMM GRANULOCYTES NFR BLD AUTO: 0.6 % (ref 0–0.5)
LYMPHOCYTES # BLD AUTO: 2 K/UL (ref 1–4.8)
MAGNESIUM SERPL-MCNC: 2.3 MG/DL (ref 1.6–2.6)
MCH RBC QN AUTO: 29.9 PG (ref 27–31)
MCHC RBC AUTO-ENTMCNC: 32.1 G/DL (ref 32–36)
MCV RBC AUTO: 93 FL (ref 82–98)
NUCLEATED RBC (/100WBC) (OHS): 0 /100 WBC
PLATELET # BLD AUTO: 268 K/UL (ref 150–450)
PMV BLD AUTO: 8.8 FL (ref 9.2–12.9)
POTASSIUM SERPL-SCNC: 4.2 MMOL/L (ref 3.5–5.1)
RBC # BLD AUTO: 3.34 M/UL (ref 4–5.4)
RELATIVE EOSINOPHIL (OHS): 0 %
RELATIVE LYMPHOCYTE (OHS): 24.4 % (ref 18–48)
RELATIVE MONOCYTE (OHS): 8 % (ref 4–15)
RELATIVE NEUTROPHIL (OHS): 66.5 % (ref 38–73)
SODIUM SERPL-SCNC: 137 MMOL/L (ref 136–145)
WBC # BLD AUTO: 8.2 K/UL (ref 3.9–12.7)

## 2025-04-01 PROCEDURE — 97116 GAIT TRAINING THERAPY: CPT

## 2025-04-01 PROCEDURE — 25000003 PHARM REV CODE 250: Performed by: STUDENT IN AN ORGANIZED HEALTH CARE EDUCATION/TRAINING PROGRAM

## 2025-04-01 PROCEDURE — 11000001 HC ACUTE MED/SURG PRIVATE ROOM

## 2025-04-01 PROCEDURE — 85025 COMPLETE CBC W/AUTO DIFF WBC: CPT | Performed by: PHYSICIAN ASSISTANT

## 2025-04-01 PROCEDURE — 25000003 PHARM REV CODE 250: Performed by: HOSPITALIST

## 2025-04-01 PROCEDURE — 63600175 PHARM REV CODE 636 W HCPCS: Performed by: HOSPITALIST

## 2025-04-01 PROCEDURE — 83735 ASSAY OF MAGNESIUM: CPT | Performed by: PHYSICIAN ASSISTANT

## 2025-04-01 PROCEDURE — 80048 BASIC METABOLIC PNL TOTAL CA: CPT | Performed by: PHYSICIAN ASSISTANT

## 2025-04-01 PROCEDURE — 25000003 PHARM REV CODE 250: Performed by: NURSE PRACTITIONER

## 2025-04-01 PROCEDURE — 97535 SELF CARE MNGMENT TRAINING: CPT

## 2025-04-01 PROCEDURE — 36415 COLL VENOUS BLD VENIPUNCTURE: CPT | Performed by: PHYSICIAN ASSISTANT

## 2025-04-01 RX ORDER — BENZONATATE 100 MG/1
100 CAPSULE ORAL ONCE
Status: COMPLETED | OUTPATIENT
Start: 2025-04-01 | End: 2025-04-01

## 2025-04-01 RX ADMIN — ACETAMINOPHEN 1000 MG: 500 TABLET ORAL at 01:04

## 2025-04-01 RX ADMIN — ACETAMINOPHEN 1000 MG: 500 TABLET ORAL at 09:04

## 2025-04-01 RX ADMIN — ACETAMINOPHEN 1000 MG: 500 TABLET ORAL at 06:04

## 2025-04-01 RX ADMIN — QUETIAPINE FUMARATE 200 MG: 200 TABLET ORAL at 09:04

## 2025-04-01 RX ADMIN — QUETIAPINE FUMARATE 25 MG: 25 TABLET ORAL at 08:04

## 2025-04-01 RX ADMIN — LEVOTHYROXINE SODIUM 112 MCG: 112 TABLET ORAL at 06:04

## 2025-04-01 RX ADMIN — ATORVASTATIN CALCIUM 40 MG: 20 TABLET, FILM COATED ORAL at 08:04

## 2025-04-01 RX ADMIN — LAMOTRIGINE 200 MG: 100 TABLET ORAL at 08:04

## 2025-04-01 RX ADMIN — ENOXAPARIN SODIUM 40 MG: 40 INJECTION SUBCUTANEOUS at 04:04

## 2025-04-01 RX ADMIN — BENZONATATE 100 MG: 100 CAPSULE ORAL at 10:04

## 2025-04-01 NOTE — PT/OT/SLP PROGRESS
Occupational Therapy   Treatment    Name: Anika Barajas  MRN: [id number]  Admitting Diagnosis:  Tibial plateau fracture       Recommendations:     Discharge Recommendations: High Intensity Therapy  Discharge Equipment Recommendations:  to be determined by next level of care  Barriers to discharge:  Inaccessible home environment, Decreased caregiver support (Current functional level)    Assessment:     Anika Barajas is a 64 y.o. female with a medical diagnosis of Tibial plateau fracture.  She presents with the following performance deficits affecting function:  weakness, impaired endurance, impaired self care skills, impaired functional mobility, gait instability, impaired balance, decreased safety awareness, decreased lower extremity function, decreased coordination, decreased ROM, impaired joint extensibility, orthopedic precautions.     Rehab Prognosis:  Good; patient would benefit from acute skilled OT services to address these deficits and reach maximum level of function.       Plan:     Patient to be seen 5 x/week to address the above listed problems via self-care/home management, therapeutic activities, therapeutic exercises  Plan of Care Expires: 04/12/25  Plan of Care Reviewed with: patient    Subjective     Chief Complaint: Needing therapy  Patient/Family Comments/goals: Pt agreeable to OT tx session.  Pain/Comfort:  Pain Rating 1: 0/10    Objective:     Patient found HOB elevated with peripheral IV, PureWick upon OT entry to room.    General Precautions: Standard, fall, seizure    Orthopedic Precautions:LLE toe touch weight bearing (with HKB locked in extension)  Braces:  (Left HKB locked in extension)  Respiratory Status: Room air     Occupational Performance:     Bed Mobility:    Supine to sit: SBA with HOB elevated       Activities of Daily Living:  UB dressing: Min A sitting EOB   LB dressing: Total A for doffing/donning  sock on left foot, SBA to doff right sock in long sitting in  bed, Max A to don right  socks in long sitting in bed, Mod A to don right ankle brace in long sitting in bed, Mod A to don right shoe in long sitting in bed.    Left HKB: Brace needing readjustment and moving it higher up on pt's left LE.  Education and training on correct placement/fit of brace and how to unfasten and fasten brace while in long sitting in bed adhering to precautions.     Education and training on use of breathing techniques when frustrated or anxious instead of yelling/screaming out.  Pt needing Min verbal cues to use breathing techniques and to take rest breaks when feeling frustrated or anxious during LB dressing activities and readjusting left HKB.      [facility] 6 Click ADL: 16    Treatment & Education:  Role of OT, adhering to Ortho precautions during self care tasks, correct fit/donning of left HKB, LB dressing, safety, discharge recs    Patient left sitting edge of bed with all lines intact and Carrie, PT present    GOALS:   Multidisciplinary Problems       Occupational Therapy Goals          Problem: Occupational Therapy    Goal Priority Disciplines Outcome Interventions   Occupational Therapy Goal     OT, PT/OT Progressing    Description: Goals to be met by: 4/4/2025     Patient will increase functional independence with ADLs by performing:    UE Dressing with Supervision.  LE Dressing with Minimal Assistance.  Grooming while EOB with Set-up Assistance.  Toilet transfer to bedside commode with Contact Guard Assistance.                         DME Justifications:   Anika Bolton requires a commode for home use because she is confined to a single room.    Time Tracking:     OT Date of Treatment: 04/01/25  OT Start Time: 1503  OT Stop Time: 1521  OT Total Time (min): 18 min    Billable Minutes:Self Care/Home Management 18    OT/ADY: OT          4/1/2025

## 2025-04-01 NOTE — PLAN OF CARE
SW received call from [facility] admissions stating they are unable to accept patient.       1:00pm  SW spoke with patient sister at bedside and she stated she went to [facility] and they would not accept her coming into facility to complete paperwork. SW escalated to CM  for direction.    CM  Giovanni sent out referrals to more skilled nursing facilities.

## 2025-04-01 NOTE — PLAN OF CARE
Report received from RN.POC reviewed with the pt.RN assumed care.[id number].Room Air.Skin integrity maintained,Pain medication administered as requested. Voided to Female catheter.All due medication administered according to the MAR.Observation reviewed and charted.Care explained,No falls or injury occurred during the shift.No any significant event undergo in the shift. Pt rest in the bed comfortably. Flow sheets updated timely. Purposeful rounding done every 2 hourly. Daily Weight charted,IP/OP maintained and charted. Pt kept under observation through out the shift.     Problem: Adult Inpatient Plan of Care  Goal: Plan of Care Review  Outcome: Progressing  Goal: Patient-Specific Goal (Individualized)  Outcome: Progressing  Goal: Absence of Hospital-Acquired Illness or Injury  Outcome: Progressing  Goal: Optimal Comfort and Wellbeing  Outcome: Progressing  Goal: Readiness for Transition of Care  Outcome: Progressing     Problem: Fall Injury Risk  Goal: Absence of Fall and Fall-Related Injury  Outcome: Progressing     Problem: Skin Injury Risk Increased  Goal: Skin Health and Integrity  Outcome: Progressing

## 2025-04-01 NOTE — ASSESSMENT & PLAN NOTE
CT- Acute depressed fracture of the medial tibial plateau. Subtle nondisplaced fracture of the proximal fibula.    Consult Ortho  Scheduled tylenol and Percocet p.r.n. for pain    Ortho recs:   Order hinge knee brace and consult PT. Non-operative treatment at this time. Continue with current pain regimen.     - PT/OT - SNF recommended - not covered by insurance  - Patient unable to return to her senior living apartment as she is not ambulatory  - Sister unable to care for her in her home  - Could be a good inpatient rehab candidate - no reason why she could not recover with therapy and resume her previous living situation

## 2025-04-01 NOTE — SUBJECTIVE & OBJECTIVE
Interval History: Assumed patient care.  She has pain in her LLE, has hinged knee brace in place.  Concerned about her insurance coverage.    Review of Systems   Constitutional:  Negative for chills and fever.   Respiratory:  Negative for cough and shortness of breath.    Cardiovascular:  Negative for chest pain and palpitations.   Musculoskeletal:  Positive for gait problem.        Left leg pain     Objective:     Vital Signs (Most Recent):  Temp: 98.4 °F (36.9 °C) (04/01/25 1602)  Pulse: 69 (04/01/25 1602)  Resp: 18 (04/01/25 1602)  BP: (!) 123/59 (04/01/25 1602)  SpO2: 95 % (04/01/25 1602) Vital Signs (24h Range):  Temp:  [97.5 °F (36.4 °C)-98.4 °F (36.9 °C)] 98.4 °F (36.9 °C)  Pulse:  [58-69] 69  Resp:  [18-20] 18  SpO2:  [93 %-98 %] 95 %  BP: ()/(55-80) 123/59     Weight: 84 kg (185 lb 3 oz)  Body mass index is 27.35 kg/m².    Intake/Output Summary (Last 24 hours) at 4/1/2025 1659  Last data filed at 4/1/2025 1457  Gross per 24 hour   Intake 796 ml   Output 3200 ml   Net -2404 ml         Physical Exam  Cardiovascular:      Rate and Rhythm: Normal rate and regular rhythm.      Heart sounds: Normal heart sounds. No murmur heard.     No gallop.   Pulmonary:      Effort: Pulmonary effort is normal.      Breath sounds: Normal breath sounds.   Abdominal:      General: Bowel sounds are normal.      Palpations: Abdomen is soft.   Musculoskeletal:      Comments: Left leg with hinged knee brace in place   Skin:     General: Skin is warm and dry.   Neurological:      General: No focal deficit present.      Mental Status: She is alert.   Psychiatric:         Mood and Affect: Mood normal.         Behavior: Behavior normal.      Comments: Cognitive impairment noted               Significant Labs: All pertinent labs within the past 24 hours have been reviewed.    Significant Imaging: I have reviewed all pertinent imaging results/findings within the past 24 hours.

## 2025-04-01 NOTE — PLAN OF CARE
Medicare Message     Important Message from Medicare regarding Discharge Appeal Rights -- Explained to patient/caregiver; Signed/date by patient/caregiver Explained to patient/caregiver; Signed/date by patient/caregiver   Date IMM was signed -- 3/25/2025 4/1/2025   Time IMM was signed -- [phone]
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
## 2025-04-01 NOTE — PLAN OF CARE
[facility] to [facility] with [facility]---left a message, awaiting return phone call.
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
## 2025-04-02 PROCEDURE — 97116 GAIT TRAINING THERAPY: CPT

## 2025-04-02 PROCEDURE — 25000003 PHARM REV CODE 250: Performed by: NURSE PRACTITIONER

## 2025-04-02 PROCEDURE — 25000003 PHARM REV CODE 250: Performed by: HOSPITALIST

## 2025-04-02 PROCEDURE — 97530 THERAPEUTIC ACTIVITIES: CPT

## 2025-04-02 PROCEDURE — 11000001 HC ACUTE MED/SURG PRIVATE ROOM

## 2025-04-02 PROCEDURE — 63600175 PHARM REV CODE 636 W HCPCS: Performed by: HOSPITALIST

## 2025-04-02 PROCEDURE — 25000003 PHARM REV CODE 250: Performed by: STUDENT IN AN ORGANIZED HEALTH CARE EDUCATION/TRAINING PROGRAM

## 2025-04-02 RX ORDER — LAMOTRIGINE 200 MG/1
200 TABLET ORAL DAILY
COMMUNITY

## 2025-04-02 RX ORDER — ACETAMINOPHEN 325 MG/1
650 TABLET ORAL EVERY 6 HOURS PRN
Status: DISCONTINUED | OUTPATIENT
Start: 2025-04-02 | End: 2025-04-07 | Stop reason: HOSPADM

## 2025-04-02 RX ADMIN — ATORVASTATIN CALCIUM 40 MG: 20 TABLET, FILM COATED ORAL at 08:04

## 2025-04-02 RX ADMIN — QUETIAPINE FUMARATE 200 MG: 200 TABLET ORAL at 10:04

## 2025-04-02 RX ADMIN — OXYCODONE HYDROCHLORIDE AND ACETAMINOPHEN 1 TABLET: 7.5; 325 TABLET ORAL at 10:04

## 2025-04-02 RX ADMIN — ENOXAPARIN SODIUM 40 MG: 40 INJECTION SUBCUTANEOUS at 05:04

## 2025-04-02 RX ADMIN — ACETAMINOPHEN 1000 MG: 500 TABLET ORAL at 05:04

## 2025-04-02 RX ADMIN — LAMOTRIGINE 200 MG: 100 TABLET ORAL at 08:04

## 2025-04-02 RX ADMIN — POLYETHYLENE GLYCOL 3350 17 G: 17 POWDER, FOR SOLUTION ORAL at 08:04

## 2025-04-02 RX ADMIN — QUETIAPINE FUMARATE 25 MG: 25 TABLET ORAL at 08:04

## 2025-04-02 RX ADMIN — LEVOTHYROXINE SODIUM 112 MCG: 112 TABLET ORAL at 05:04

## 2025-04-02 RX ADMIN — SENNOSIDES AND DOCUSATE SODIUM 2 TABLET: 50; 8.6 TABLET ORAL at 08:04

## 2025-04-02 NOTE — ASSESSMENT & PLAN NOTE
CT- Acute depressed fracture of the medial tibial plateau. Subtle nondisplaced fracture of the proximal fibula.    Consult Ortho  Percocet p.r.n. for pain    Ortho recs:   Order hinge knee brace and consult PT. Non-operative treatment at this time. Continue with current pain regimen.     - PT/OT - SNF recommended - not covered by insurance  - Patient unable to return to her senior living apartment as she is not ambulatory  - Sister unable to care for her in her home  - Could be a good inpatient rehab candidate - no reason why she could not recover with therapy and resume her previous living situation

## 2025-04-02 NOTE — PLAN OF CARE
[facility] reports patient has full [facility] coverage - awaiting clarification if coverage is through [facility] or straight Medicaid - Sadaf ([facility]) reports unable to accept patient through straight Medicaid & when [facility] benefits ran reflect only behavioral coverage - referrals forwarded to Anup Aleman, & Srinivasan [facility] to question ability to accept
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
## 2025-04-02 NOTE — PLAN OF CARE
Pt remained free of falls and injuries. AAOx4. Pt calm, pleasant, and cooperative. Purposeful hourly rounding performed. Pt swallows meds whole. IV flushed and saline locked. Pt c/o  L knee pain managed with scheduled Tylenol with full relief. LLE hinge brace maintained. Frequent weight shifting encouraged, weight shift assistance provided. Pure wick in place to suction. VSS on RA. Pt sleeping in bed, even rise and fall of chest. Bed low and locked, bed alarm on, call light in reach. Side rails up x3. Will continue to monitor.
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
## 2025-04-02 NOTE — PT/OT/SLP PROGRESS
Occupational Therapy   Treatment    Name: Anika Barajas  MRN: [id number]  Admitting Diagnosis:  Tibial plateau fracture       Recommendations:     Discharge Recommendations: High Intensity Therapy  Discharge Equipment Recommendations:  to be determined by next level of care, bedside commode, walker, rolling  Barriers to discharge:  Inaccessible home environment, Decreased caregiver support (Current functional level)    Assessment:     Anika Barajas is a 64 y.o. female with a medical diagnosis of Tibial plateau fracture.  She presents with the following performance deficits affecting function: weakness, impaired endurance, impaired self care skills, impaired functional mobility, impaired balance, gait instability, decreased safety awareness, decreased lower extremity function, decreased coordination, impaired cognition, impaired joint extensibility, orthopedic precautions, edema.     Rehab Prognosis:  Good; patient would benefit from acute skilled OT services to address these deficits and reach maximum level of function.       Plan:     Patient to be seen 5 x/week to address the above listed problems via self-care/home management, therapeutic activities, therapeutic exercises  Plan of Care Expires: 04/12/25  Plan of Care Reviewed with: patient    Subjective     Chief Complaint: None  Patient/Family Comments/goals: Pt stating that she is still going to be using a wheelchair even though she is using the RW so she wants more training in use of a wheelchair from OT today.   Pain/Comfort:  Pain Rating 1: 0/10    Objective:     Communicated with: nurse prior to session.  Patient found up in chair with Farida more IV upon OT entry to room.    General Precautions: Standard, fall, seizure    Orthopedic Precautions:LLE toe touch weight bearing  Braces:  (Left HKB locked in extension)  Respiratory Status: Room air     Occupational Performance:       Functional Mobility/Transfers:  Sit to stand: SBA<>CGA  with RW  Transfers: CGA with RW  Functional Mobility: CGA with RW  Propelling wheelchair: SBA using bilateral hands on wheels and right foot to assist with propelling and navigation.  Pt not needing assistance in wide pathways but needing assistance for navigation in tight space in hospital room  Education and training on locking/unlocking wheelchair brakes, elevating left LE with foot rest    Activities of Daily Living:  Toileting: Uses BSC with assistance, using external female catheter to suction    AMPAC 6 Click ADL: 16    Treatment & Education:  Role of of OT, POC, wheelchair for ADL mobility, safety and parts of wheelchair for ADL mobility, discharge recs    Patient left up in chair with all lines intact and call button in reach    GOALS:   Multidisciplinary Problems       Occupational Therapy Goals          Problem: Occupational Therapy    Goal Priority Disciplines Outcome Interventions   Occupational Therapy Goal     OT, PT/OT Progressing    Description: Goals to be met by: 4/4/2025     Patient will increase functional independence with ADLs by performing:    UE Dressing with Supervision.  LE Dressing with Minimal Assistance.  Grooming while EOB with Set-up Assistance.  Toilet transfer to bedside commode with Contact Guard Assistance.                         DME Justifications:   Anika Bolton requires a commode for home use because she is confined to a single room.   Anika Bolton's mobility limitation cannot be sufficiently resolved by the use of a cane. Her functional mobility deficit can be sufficiently resolved with the use of a Rolling Walker. Patient's mobility limitation significantly impairs their ability to participate in one of more activities of daily living.  The use of a RW will significantly improve the patient's ability to participate in MRADLS and the patient will use it on regular basis in the home.    Time Tracking:     OT Date of Treatment: 04/02/25  OT Start Time: 1457  OT Stop Time:  1518  OT Total Time (min): 21 min    Billable Minutes:Therapeutic Activity 21    OT/ADY: OT          4/2/2025

## 2025-04-02 NOTE — PLAN OF CARE
NONA called [facility] and left a message with reception fro the person in admissions to call NONA back. Call was not returned from yesterday evening. NONA to continue to follow.     9:24am  NONA spoke with Joan at [facility] and she is training the new admissions coordinator at the facility and they will work on patient admit today.

## 2025-04-02 NOTE — SUBJECTIVE & OBJECTIVE
Interval History: Complains of headache.  Did not get coffee this morning.  Asking for Tylenol.    Review of Systems   Constitutional:  Negative for chills and fever.   Respiratory:  Negative for cough and shortness of breath.    Cardiovascular:  Negative for chest pain and palpitations.   Musculoskeletal:  Positive for gait problem.        Left leg pain   Neurological:  Positive for headaches.     Objective:     Vital Signs (Most Recent):  Temp: 98.2 °F (36.8 °C) (04/02/25 0717)  Pulse: 70 (04/02/25 0717)  Resp: 18 (04/02/25 0717)  BP: 108/74 (04/02/25 0717)  SpO2: 96 % (04/02/25 0717) Vital Signs (24h Range):  Temp:  [97.5 °F (36.4 °C)-98.4 °F (36.9 °C)] 98.2 °F (36.8 °C)  Pulse:  [63-70] 70  Resp:  [18] 18  SpO2:  [95 %-98 %] 96 %  BP: ()/(50-74) 108/74     Weight: 84 kg (185 lb 3 oz)  Body mass index is 27.35 kg/m².    Intake/Output Summary (Last 24 hours) at 4/2/2025 1100  Last data filed at 4/2/2025 0959  Gross per 24 hour   Intake 1920 ml   Output 4200 ml   Net -2280 ml         Physical Exam  Cardiovascular:      Rate and Rhythm: Normal rate and regular rhythm.      Heart sounds: Normal heart sounds. No murmur heard.     No gallop.   Pulmonary:      Effort: Pulmonary effort is normal.      Breath sounds: Normal breath sounds.   Abdominal:      General: Bowel sounds are normal.      Palpations: Abdomen is soft.   Musculoskeletal:      Comments: Left leg with hinged knee brace in place   Skin:     General: Skin is warm and dry.   Neurological:      General: No focal deficit present.      Mental Status: She is alert.   Psychiatric:         Mood and Affect: Mood normal.         Behavior: Behavior normal.      Comments: Cognitive impairment noted               Significant Labs: All pertinent labs within the past 24 hours have been reviewed.    Significant Imaging: I have reviewed all pertinent imaging results/findings within the past 24 hours.

## 2025-04-02 NOTE — PLAN OF CARE
NURSING HOME ORDERS    04/07/2025  [facility] ([facility])  [facility] ([facility])  [address]  [place] [address]  Dept: [phone]  Loc: [phone]     Admit to Nursing Home      Diagnoses:  Active Hospital Problems    Diagnosis  POA    *Tibial plateau fracture [S82.143A]  Yes    Bipolar 1 disorder [F31.9]  Yes    Seizure [R56.9]  Yes    Acquired hypothyroidism [E03.9]  Yes      Resolved Hospital Problems   No resolved problems to display.         Allergies:Review of patient's allergies indicates:  No Known Allergies    Vitals:  Routine    Diet: regular diet    Activities:   Activity as tolerated.  Weight bearing: left leg toe touch ambulation with hinged knee braced locked in extension at all times.  Right leg weight bearing as tolerated.    Goals of Care Treatment Preferences:  Code Status: Full Code      Nursing Precautions:  Seizure and Pressure ulcer prevention    Consults:  PT and OT evaluate and treat 3x/week, weight bearing as noted above.    Follow up:  With [facility] after discharge from SNF.  Maintain knee brace and toe touch weight bearing until then (at least 4 weeks from original injury 3/19)    Medications: Discontinue all previous medication orders, if any. See new list below.     Medication List        START taking these medications      rOPINIRole 0.25 MG tablet  Commonly known as: REQUIP  Take 1 tablet (0.25 mg total) by mouth every evening.            CONTINUE taking these medications      albuterol 90 mcg/actuation inhaler  Commonly known as: PROVENTIL/VENTOLIN HFA  Inhale 2 puffs into the lungs every 6 (six) hours as needed for Wheezing.     lamoTRIgine 200 MG tablet  Commonly known as: LAMICTAL  Take 200 mg by mouth once daily.     levothyroxine 112 MCG tablet  Commonly known as: SYNTHROID  Take 112 mcg by mouth before breakfast.     * QUEtiapine 200 MG Tab  Commonly known as: SEROQUEL  Take 200 mg by mouth every evening.     * QUEtiapine 25 MG  Tab  Commonly known as: SEROQUEL  Take 25 mg by mouth once daily.           * This list has 2 medication(s) that are the same as other medications prescribed for you. Read the directions carefully, and ask your doctor or other care provider to review them with you.                           _________________________________  Alix Schumacher MD  04/07/2025

## 2025-04-03 PROCEDURE — 25000003 PHARM REV CODE 250: Performed by: HOSPITALIST

## 2025-04-03 PROCEDURE — 97530 THERAPEUTIC ACTIVITIES: CPT | Mod: CQ

## 2025-04-03 PROCEDURE — 63600175 PHARM REV CODE 636 W HCPCS: Performed by: HOSPITALIST

## 2025-04-03 PROCEDURE — 11000001 HC ACUTE MED/SURG PRIVATE ROOM

## 2025-04-03 PROCEDURE — 25000003 PHARM REV CODE 250: Performed by: NURSE PRACTITIONER

## 2025-04-03 PROCEDURE — 25000003 PHARM REV CODE 250: Performed by: STUDENT IN AN ORGANIZED HEALTH CARE EDUCATION/TRAINING PROGRAM

## 2025-04-03 RX ORDER — ROPINIROLE 0.25 MG/1
0.25 TABLET, FILM COATED ORAL NIGHTLY
Status: COMPLETED | OUTPATIENT
Start: 2025-04-03 | End: 2025-04-04

## 2025-04-03 RX ADMIN — QUETIAPINE FUMARATE 25 MG: 25 TABLET ORAL at 08:04

## 2025-04-03 RX ADMIN — ROPINIROLE HYDROCHLORIDE 0.25 MG: 0.25 TABLET, FILM COATED ORAL at 10:04

## 2025-04-03 RX ADMIN — LAMOTRIGINE 200 MG: 100 TABLET ORAL at 08:04

## 2025-04-03 RX ADMIN — ACETAMINOPHEN 650 MG: 325 TABLET, FILM COATED ORAL at 10:04

## 2025-04-03 RX ADMIN — LEVOTHYROXINE SODIUM 112 MCG: 112 TABLET ORAL at 05:04

## 2025-04-03 RX ADMIN — SENNOSIDES AND DOCUSATE SODIUM 2 TABLET: 50; 8.6 TABLET ORAL at 08:04

## 2025-04-03 RX ADMIN — ATORVASTATIN CALCIUM 40 MG: 20 TABLET, FILM COATED ORAL at 08:04

## 2025-04-03 RX ADMIN — ENOXAPARIN SODIUM 40 MG: 40 INJECTION SUBCUTANEOUS at 05:04

## 2025-04-03 RX ADMIN — QUETIAPINE FUMARATE 200 MG: 200 TABLET ORAL at 10:04

## 2025-04-03 RX ADMIN — POLYETHYLENE GLYCOL 3350 17 G: 17 POWDER, FOR SOLUTION ORAL at 08:04

## 2025-04-03 NOTE — ASSESSMENT & PLAN NOTE
CT- Acute depressed fracture of the medial tibial plateau. Subtle nondisplaced fracture of the proximal fibula.    Consult Ortho  Percocet p.r.n. for pain    Ortho recs:   Order hinge knee brace and consult PT. Non-operative treatment at this time. Continue with current pain regimen.   Non weight bearing with knee brace for at least 4 weeks, then re-evaluate with orthopedics.    - PT/OT - SNF recommended - not covered by insurance  - Patient unable to return to her senior living apartment as she is not ambulatory  - Sister unable to care for her in her home  - Could be a good inpatient rehab candidate - no reason why she could not recover with therapy and resume her previous living situation

## 2025-04-03 NOTE — SUBJECTIVE & OBJECTIVE
"Interval History: She complains of her legs "jumping at night."  Getting very irritated about long delay for rehab and her insurance.    Review of Systems   Constitutional:  Negative for chills and fever.   Respiratory:  Negative for cough and shortness of breath.    Cardiovascular:  Negative for chest pain and palpitations.   Musculoskeletal:  Positive for gait problem.        Left leg pain.  Both legs "jumping" at night   Neurological:  Positive for headaches.     Objective:     Vital Signs (Most Recent):  Temp: 98.4 °F (36.9 °C) (04/03/25 1530)  Pulse: 70 (04/03/25 1530)  Resp: 18 (04/03/25 1530)  BP: (!) 111/55 (04/03/25 1530)  SpO2: 95 % (04/03/25 1530) Vital Signs (24h Range):  Temp:  [97.7 °F (36.5 °C)-98.8 °F (37.1 °C)] 98.4 °F (36.9 °C)  Pulse:  [57-73] 70  Resp:  [18-20] 18  SpO2:  [92 %-97 %] 95 %  BP: ()/(52-69) 111/55     Weight: 84 kg (185 lb 3 oz)  Body mass index is 27.35 kg/m².    Intake/Output Summary (Last 24 hours) at 4/3/2025 1758  Last data filed at 4/3/2025 1532  Gross per 24 hour   Intake 3120 ml   Output 4180 ml   Net -1060 ml         Physical Exam  Cardiovascular:      Rate and Rhythm: Normal rate and regular rhythm.      Heart sounds: Normal heart sounds. No murmur heard.     No gallop.   Pulmonary:      Effort: Pulmonary effort is normal.      Breath sounds: Normal breath sounds.   Abdominal:      General: Bowel sounds are normal.      Palpations: Abdomen is soft.   Musculoskeletal:      Comments: Left leg with hinged knee brace in place   Skin:     General: Skin is warm and dry.   Neurological:      General: No focal deficit present.      Mental Status: She is alert.   Psychiatric:         Mood and Affect: Mood normal.         Behavior: Behavior normal.      Comments: Cognitive impairment noted               Significant Labs: All pertinent labs within the past 24 hours have been reviewed.    Significant Imaging: I have reviewed all pertinent imaging results/findings within the past " 24 hours.

## 2025-04-03 NOTE — PLAN OF CARE
Recommendations  1) Continue regular diet   2) Encourage intake of meals   3) RD to monitor and follow up     Goals:  Encourage patient to continue eating 100% of meals daily.     Nutrition Goal Status: new     Communication of RD Recs: other (comment) (POC)
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
## 2025-04-03 NOTE — PROGRESS NOTES
[facility] - Med Surg ([facility])  Adult Nutrition  Progress Note    SUMMARY       Recommendations  1) Continue regular diet   2) Encourage intake of meals   3) RD to monitor and follow up    Goals:  Encourage patient to continue eating 100% of meals daily.    Nutrition Goal Status: new    Communication of RD Recs: other (comment) (POC)    Nutrition Discharge Planning    Nutrition Discharge Planning: General healthy diet    Assessment and Plan  Nutrition Problem  Increased Nutrient Needs    Related to (etiology):   Wound Healing    Signs and Symptoms (as evidenced by):   Tibial Plateau Fracture    Interventions/Recommendations (treatment strategy):  Commercial beverages  General healthful diet  Collaboration of care with other providers    Nutrition Diagnosis Status:   New       Malnutrition Assessment                 Orbital Region (Subcutaneous Fat Loss): well nourished  Upper Arm Region (Subcutaneous Fat Loss): well nourished  Thoracic and Lumbar Region: well nourished   [place] Region (Muscle Loss): well nourished  Clavicle Bone Region (Muscle Loss): well nourished  Clavicle and Acromion Bone Region (Muscle Loss): well nourished  Scapular Bone Region (Muscle Loss): well nourished  Dorsal Hand (Muscle Loss): well nourished  Patellar Region (Muscle Loss): well nourished  Anterior Thigh Region (Muscle Loss): well nourished  Posterior Calf Region (Muscle Loss): well nourished                 Reason for Assessment    Reason For Assessment: RD follow-up  Diagnosis: other (see comments) (Tibial plateau fracture)  General Information Comments: Patient admitted for tibial plateau fracture. Patient is on regular diet. Food intolerance to milk, pork sausage, and diaz. No nausea, vomiting, or diarrhea. Has constipation, but was given stool softners which helped. Reports good appetite at home. Patient reports eating 100% of her meals daily. She works with physcial activity to get some excercise in. Brian Score: 19. PIV. Female  "External Urinary Catheter w/ Suction. Nourished.    Nutrition/Diet History    Nutrition Intake History: Regular  Food Preferences: No pork sausage and diaz  Spiritual, Cultural Beliefs, Advent Practices, Values that Affect Care: no  Food Allergies: other (see comments) (milk intolerance)  Factors Affecting Nutritional Intake: None identified at this time    Anthropometrics    Height: 5' 9" (175.3 cm)  Height (inches): 69 in  Height Method: Stated  Weight: 84 kg (185 lb 3 oz)  Weight (lb): 185.19 lb  Weight Method: Bed Scale  Ideal Body Weight (IBW), Female: 145 lb  % Ideal Body Weight, Female (lb): 127.72 %  BMI (Calculated): 27.3  BMI Grade: 25 - 29.9 - overweight       Lab/Procedures/Meds    Pertinent Labs Reviewed: reviewed (No new labs.)  Pertinent Medications Reviewed: reviewed  Pertinent Medications Comments: Acetaminophen, atorvastatin, dextrose, glucagon, glucose chewable, levothyroxine, melatonin, ondansetron, oxycodone-acetaminophen, polyethylene glycol packet, senna docusate, sodium chloride    Estimated/Assessed Needs    Weight Used For Calorie Calculations: 84 kg (185 lb 3 oz)  Energy Calorie Requirements (kcal): 1885  Energy Need Method: [place]-St Jeor (x 1.3)  Protein Requirements: 84 g (1g/kg)  Weight Used For Protein Calculations: 84 kg (185 lb 3 oz)  Fluid Requirements (mL): 1 mL/kcal  Estimated Fluid Requirement Method: RDA Method  RDA Method (mL): 1885         Nutrition Prescription Ordered    Current Diet Order: Regular  Oral Nutrition Supplement: Boost daily    Evaluation of Received Nutrient/Fluid Intake    I/O: -21 L since admit  Energy Calories Required: meeting needs  Protein Required: meeting needs  Fluid Required: meeting needs  Comments: LBM: 4/3/25  Tolerance: tolerating  % Intake of Estimated Energy Needs: 75 - 100 %  % Meal Intake: 75 - 100 %    Nutrition Risk    Level of Risk/Frequency of Follow-up: low     Monitor and Evaluation    Monitor and Evaluation: Diet order, Weight, " Nutrition focused physical findings     Nutrition Follow-Up    RD Follow-up?: Yes    Maday Brady, Student Dietitian

## 2025-04-03 NOTE — PLAN OF CARE
04/03/25 1430   Discharge Reassessment   Assessment Type Discharge Planning Reassessment   Did the patient's condition or plan change since previous assessment? No   Discharge Plan discussed with: Patient   Communicated PARMJIT with patient/caregiver Date not available/Unable to determine   Discharge Plan A New Nursing Home placement - nursing home care facility   Discharge Plan B Home Health;Home   DME Needed Upon Discharge  none   Transition of Care Barriers None   Why the patient remains in the hospital Placement issues;Insurance issues   Post-Acute Status   Post-Acute Authorization Placement   Post-Acute Placement Status Referrals Sent   Discharge Delays (!) Post-Acute Set-up     NONA had been speaking with medicaid rep and [facility] and unfortunately rehab is saying patient still does not have IPR benefits.     NONA went back to original plan of nursing home NH placement with [facility], spoke with Joan and the facility is working on obtaining financials from patient's state appointed payee. NONA to continue to follow.

## 2025-04-03 NOTE — PT/OT/SLP PROGRESS
"Physical Therapy Treatment    Patient Name:  Anika Barajas   MRN:  [id number]    Recommendations:     Discharge Recommendations: High Intensity Therapy  Discharge Equipment Recommendations: to be determined by next level of care, walker, rolling, wheelchair, bedside commode (20" WC with L elevating leg rest, cushion, and anti-tippers)  Barriers to discharge: Inaccessible home and Decreased caregiver support    Assessment:     Anika Barajas is a 64 y.o. female admitted with a medical diagnosis of Tibial plateau fracture.  She presents with the following impairments/functional limitations: weakness, gait instability, edema, orthopedic precautions, impaired balance, impaired functional mobility, impaired cognition, impaired joint extensibility, impaired self care skills, decreased coordination, decreased lower extremity function, decreased safety awareness.    Supine>sit with mod(I)  Sit>stand with RW and Hilario  Bed>Chair with RW and Hilario, stand/pivot, pt with L hinged knee brace donned  Pt eager for OOB, demos good use of RW for pivot transfer  Rec High Intensity therapy    Prior to admission pt was modified independent with mobility and self-care and there is expectation of returning to prior level of function to maintain independence avoiding readmission. Pt is at high risk of unplanned readmission due to fall risk and lack of 24 hour caregiver in prior setting. The lower level of care cannot provide total interdisciplinary approach needed. Pt is able to tolerate 3 hours of daily therapy. Pt is pleasant and motivated to return to prior level of function     Rehab Prognosis: Good; patient would benefit from acute skilled PT services to address these deficits and reach maximum level of function.    Recent Surgery: * No surgery found *      Plan:     During this hospitalization, patient to be seen 5 x/week to address the identified rehab impairments via gait training, therapeutic activities, therapeutic " Attending Physician Attestation Note:    Resident Physician: Viraj Washington MD    Pt is a 29 year old female here for the following concerns:  Chief Complaint   Patient presents with   • Dizziness     itchy vagina     Visit Vitals  /86   Pulse (!) 104   Temp 98.3 °F (36.8 °C) (Oral)   Resp 18   Wt 79.5 kg   LMP 02/15/2021   SpO2 99%   BMI 32.08 kg/m²       Impression and Plan:  Mild tachycardia    For the last 6 days (longer per chart review) episodes of fatigue, palpitations. No syncopal or presyncopal events.   Eating and drinking as normal   Negative test for DM, thyroid test recheck today  Heart exam reassuring today  - EKG today  - consider holter monitor if persistence at f/u in a couple of weeks     Vaginal pruritus for the last month  Burning with urination on the vaginal area, white discharge   Declined a pelvic exam   Due for pap smear     Likely candidal vaginitis, swabs collected today    The patient was seen by me as well and I agree with the history, exam and plan as noted by the resident physician.   Please see resident note for details.    a procedure was not performed today.      Lucía Avalos MD  2/22/2021             exercises, neuromuscular re-education, wheelchair management/training and progress toward the following goals:    Plan of Care Expires:  25    Subjective     Chief Complaint: none stated  Patient/Family Comments/goals: I don't need my ankle brace and shoe for just a short transfer  Pain/Comfort:  Pain Rating 1: 0/10  Pain Rating Post-Intervention 1: 0/10      Objective:     Communicated with nurse Dickson prior to session.  Patient found HOB elevated with peripheral IV, PureWick upon PT entry to room.     General Precautions: Standard, fall, seizure  Orthopedic Precautions: LLE toe touch weight bearing  Braces: Hinged knee brace  Respiratory Status: Room air     Functional Mobility:  Bed Mobility:     Supine to Sit: modified independence  Transfers:     Sit to Stand:  minimum assistance with rolling walker  Bed to Chair: minimum assistance with  rolling walker  using  Stand Pivot      AM-PAC 6 CLICK MOBILITY  Turning over in bed (including adjusting bedclothes, sheets and blankets)?: 3  Sitting down on and standing up from a chair with arms (e.g., wheelchair, bedside commode, etc.): 3  Moving from lying on back to sitting on the side of the bed?: 4  Moving to and from a bed to a chair (including a wheelchair)?: 3  Need to walk in hospital room?: 2  Climbing 3-5 steps with a railing?: 1  Basic Mobility Total Score: 16       Treatment & Education:  Pt performing HEP independently at end of session  Transfers as noted    Patient left up in chair with all lines intact, call button in reach, and nurse Randolph notified..    GOALS:   Multidisciplinary Problems       Physical Therapy Goals          Problem: Physical Therapy    Goal Priority Disciplines Outcome Interventions   Physical Therapy Goal     PT, PT/OT Progressing    Description: Goals to be met by: 25    Patient will increase functional independence with mobility by performin. Supine<>sit with indep without use of HB features  2. Sit<>stand  with mod(I) and RW.   3. Transfer bed<>WC with mod(I) and LRAD with most appropriate technique.  4. Gait  x 10 feet with RW and supervision maintaining TTWB of LLE.  5. Propel WC x300 ft with supervision including 180 deg turns and applying brakes without cueing.                        DME Justifications:  No DME recommended requiring DME justifications    Time Tracking:     PT Received On: 04/03/25  PT Start Time: 1454     PT Stop Time: 1503  PT Total Time (min): 9 min     Billable Minutes: Therapeutic Activity 9    Treatment Type: Treatment  PT/PTA: PTA     Number of PTA visits since last PT visit: 1 04/03/2025

## 2025-04-03 NOTE — PLAN OF CARE
"NONA faxed Medicaid eligibility verification sheet to karl [facility] [phone].    Your fax has been successfully sent to [phone] at [phone].  ------------------------------------------------------------  From: [id number]  ------------------------------------------------------------  4/3/2025 9:10:41 AM Transmission Record          Sent to [phone] with remote ID "[email or web address]"          Result: (0/339;0/0) Success          Page record: 1 - 4          Elapsed time: 01:30 on channel 28    "

## 2025-04-03 NOTE — PLAN OF CARE
Pt remained free of falls and injuries. AAOx4. Pt calm, pleasant, and cooperative. Purposeful hourly rounding performed. Pt swallows meds whole. IV flushed and saline locked. Pt c/o  L knee pain managed with PRN Percocet with full relief. LLE hinge brace maintained. Frequent weight shifting encouraged, weight shift assistance provided. Pure wick in place to suction. Strict I's & O's maintained, see flowsheet. BP low again this AM, pt asymptomatic, NP aware. VSS on RA. Pt sleeping in bed, even rise and fall of chest. Bed low and locked, bed alarm on, call light in reach. Side rails up x3. Will continue to monitor.

## 2025-04-04 PROCEDURE — 25000003 PHARM REV CODE 250: Performed by: NURSE PRACTITIONER

## 2025-04-04 PROCEDURE — 97535 SELF CARE MNGMENT TRAINING: CPT

## 2025-04-04 PROCEDURE — 97116 GAIT TRAINING THERAPY: CPT | Mod: CQ

## 2025-04-04 PROCEDURE — 25000003 PHARM REV CODE 250: Performed by: HOSPITALIST

## 2025-04-04 PROCEDURE — 11000001 HC ACUTE MED/SURG PRIVATE ROOM

## 2025-04-04 PROCEDURE — 25000003 PHARM REV CODE 250: Performed by: STUDENT IN AN ORGANIZED HEALTH CARE EDUCATION/TRAINING PROGRAM

## 2025-04-04 PROCEDURE — 63600175 PHARM REV CODE 636 W HCPCS: Performed by: HOSPITALIST

## 2025-04-04 RX ADMIN — QUETIAPINE FUMARATE 200 MG: 200 TABLET ORAL at 08:04

## 2025-04-04 RX ADMIN — SENNOSIDES AND DOCUSATE SODIUM 2 TABLET: 50; 8.6 TABLET ORAL at 09:04

## 2025-04-04 RX ADMIN — LAMOTRIGINE 200 MG: 100 TABLET ORAL at 09:04

## 2025-04-04 RX ADMIN — QUETIAPINE FUMARATE 25 MG: 25 TABLET ORAL at 09:04

## 2025-04-04 RX ADMIN — LEVOTHYROXINE SODIUM 112 MCG: 112 TABLET ORAL at 06:04

## 2025-04-04 RX ADMIN — ENOXAPARIN SODIUM 40 MG: 40 INJECTION SUBCUTANEOUS at 05:04

## 2025-04-04 RX ADMIN — ROPINIROLE HYDROCHLORIDE 0.25 MG: 0.25 TABLET, FILM COATED ORAL at 08:04

## 2025-04-04 RX ADMIN — OXYCODONE HYDROCHLORIDE AND ACETAMINOPHEN 1 TABLET: 7.5; 325 TABLET ORAL at 09:04

## 2025-04-04 RX ADMIN — ATORVASTATIN CALCIUM 40 MG: 20 TABLET, FILM COATED ORAL at 09:04

## 2025-04-04 RX ADMIN — OXYCODONE HYDROCHLORIDE AND ACETAMINOPHEN 1 TABLET: 7.5; 325 TABLET ORAL at 08:04

## 2025-04-04 RX ADMIN — MELATONIN TAB 3 MG 6 MG: 3 TAB at 08:04

## 2025-04-04 NOTE — SUBJECTIVE & OBJECTIVE
Interval History: Restless legs better with low dose ropinirole given last night.  Working with therapy.  No new complaints.    Review of Systems   Constitutional:  Negative for chills and fever.   Respiratory:  Negative for cough and shortness of breath.    Cardiovascular:  Negative for chest pain and palpitations.   Musculoskeletal:  Positive for gait problem.        Left leg pain.     Neurological:  Positive for headaches.     Objective:     Vital Signs (Most Recent):  Temp: 98 °F (36.7 °C) (04/04/25 1145)  Pulse: 62 (04/04/25 1145)  Resp: 18 (04/04/25 1145)  BP: (!) 93/55 (04/04/25 1145)  SpO2: 96 % (04/04/25 1145) Vital Signs (24h Range):  Temp:  [97.7 °F (36.5 °C)-98.7 °F (37.1 °C)] 98 °F (36.7 °C)  Pulse:  [56-70] 62  Resp:  [18-20] 18  SpO2:  [94 %-97 %] 96 %  BP: ()/(50-73) 93/55     Weight: 84 kg (185 lb 3 oz)  Body mass index is 27.35 kg/m².    Intake/Output Summary (Last 24 hours) at 4/4/2025 1342  Last data filed at 4/4/2025 0934  Gross per 24 hour   Intake 2280 ml   Output 3500 ml   Net -1220 ml         Physical Exam  Cardiovascular:      Rate and Rhythm: Normal rate and regular rhythm.      Heart sounds: Normal heart sounds. No murmur heard.     No gallop.   Pulmonary:      Effort: Pulmonary effort is normal.      Breath sounds: Normal breath sounds.   Abdominal:      General: Bowel sounds are normal.      Palpations: Abdomen is soft.   Musculoskeletal:      Comments: Left leg with hinged knee brace in place   Skin:     General: Skin is warm and dry.   Neurological:      General: No focal deficit present.      Mental Status: She is alert.   Psychiatric:         Mood and Affect: Mood normal.         Behavior: Behavior normal.      Comments: Cognitive impairment noted               Significant Labs: All pertinent labs within the past 24 hours have been reviewed.    Significant Imaging: I have reviewed all pertinent imaging results/findings within the past 24 hours.

## 2025-04-04 NOTE — PLAN OF CARE
NONA reached out to Joan with [facility] to get an update on patient admission. Joan stated she had not heard from the payee, Mr. Galdamez. NONA called MR. Galdamez and confirmed that he had received an email from Joan, however, the 1st and 3rd of each month are hectic for him. NONA verbalized understanding and empathy, however, NONA also stressed how pertinent it is that we retrieve financial statements for patient. NONA also called patient sister, no answer--left message.
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
## 2025-04-04 NOTE — PT/OT/SLP PROGRESS
"Physical Therapy Treatment    Patient Name:  Anika Barajas   MRN:  [id number]    Recommendations:     Discharge Recommendations: High Intensity Therapy  Discharge Equipment Recommendations: to be determined by next level of care, walker, rolling, wheelchair, bedside commode (20" WC with L elevating leg rest, cushion, and anti-tippers)  Barriers to discharge: Inaccessible home and Decreased caregiver support    Assessment:     Anika Barajas is a 64 y.o. female admitted with a medical diagnosis of Tibial plateau fracture.  She presents with the following impairments/functional limitations: weakness, gait instability, edema, orthopedic precautions, impaired balance, impaired functional mobility, impaired self care skills, impaired joint extensibility, decreased coordination, decreased lower extremity function, decreased ROM, decreased safety awareness.    Sit>stand from chair with RW and SBA, cueing for RLE TTWB  Amb ~20' with RW and CGA, RLE TTWB  Pt showing improvement in adherence to WB precautions with more deft use of RW to make turns  Rec High Intensity Therapy    Rehab Prognosis: Good; patient would benefit from acute skilled PT services to address these deficits and reach maximum level of function.    Recent Surgery: * No surgery found *      Plan:     During this hospitalization, patient to be seen 5 x/week to address the identified rehab impairments via gait training, therapeutic activities, therapeutic exercises, neuromuscular re-education, wheelchair management/training and progress toward the following goals:    Plan of Care Expires:  04/18/25    Subjective     Chief Complaint: none stated  Patient/Family Comments/goals: pt enthusiastic to participate in therapy  Pain/Comfort:  Pain Rating 1: 0/10  Pain Rating Post-Intervention 1: 0/10      Objective:     Communicated with nurse Baptiste prior to session.  Patient found up in chair with Farida more IV upon PT entry to room.     General " Precautions: Standard, fall, seizure  Orthopedic Precautions: LLE toe touch weight bearing  Braces: Hinged knee brace  Respiratory Status: Room air     Functional Mobility:  Transfers:     Sit to Stand:  stand by assistance with rolling walker  Gait: ~20' with RW and CGA, RLE TTWB      AM-PAC 6 CLICK MOBILITY  Turning over in bed (including adjusting bedclothes, sheets and blankets)?: 3  Sitting down on and standing up from a chair with arms (e.g., wheelchair, bedside commode, etc.): 3  Moving from lying on back to sitting on the side of the bed?: 4  Moving to and from a bed to a chair (including a wheelchair)?: 3  Need to walk in hospital room?: 2  Climbing 3-5 steps with a railing?: 1  Basic Mobility Total Score: 16       Treatment & Education:  Reclined therex BLE: AP, hip abd/add x 10  LLE: SLR x 10  Seated therex RLE: LAQ x 10  Gait training as noted    Patient left up in chair with all lines intact and call button in reach..    GOALS:   Multidisciplinary Problems       Physical Therapy Goals          Problem: Physical Therapy    Goal Priority Disciplines Outcome Interventions   Physical Therapy Goal     PT, PT/OT Progressing    Description: Goals to be met by: 25    Patient will increase functional independence with mobility by performin. Supine<>sit with indep without use of HB features  2. Sit<>stand with mod(I) and RW.   3. Transfer bed<>WC with mod(I) and LRAD with most appropriate technique.  4. Gait  x 10 feet with RW and supervision maintaining TTWB of LLE.  5. Propel WC x300 ft with supervision including 180 deg turns and applying brakes without cueing.                        DME Justifications:   Anika Bolton requires a commode for home use because she is confined to a single room.  Anika Barajas has a mobility limitation that significantly impairs her ability to participate in one or more mobility related activities of daily living (MRADLs) such as toileting, feeding, dressing,  grooming, and bathing in customary locations in the home.  The mobility limitation cannot be sufficiently resolved by the use of a cane or walker.   The use of a manual wheelchair will significantly improve the patients ability to participate in MRADLS and the patient will use it on regular basis in the home.  Anika Barajas has expressed her willingness to use a manual wheelchair in the home. Patients upper body strength is sufficient for propulsion.  She also has a caregiver who is available, willing, and able to provide assistance with the wheelchair when needed.     Anika Bolton's mobility limitation cannot be sufficiently resolved by the use of a cane. Her functional mobility deficit can be sufficiently resolved with the use of a Rolling Walker. Patient's mobility limitation significantly impairs their ability to participate in one of more activities of daily living.  The use of a RW will significantly improve the patient's ability to participate in MRADLS and the patient will use it on regular basis in the home.    Time Tracking:     PT Received On: 04/04/25  PT Start Time: 1352     PT Stop Time: 1408  PT Total Time (min): 16 min     Billable Minutes: Gait Training 16    Treatment Type: Treatment  PT/PTA: PTA     Number of PTA visits since last PT visit: 2     04/04/2025

## 2025-04-04 NOTE — ASSESSMENT & PLAN NOTE
CT- Acute depressed fracture of the medial tibial plateau. Subtle nondisplaced fracture of the proximal fibula.    Consult Ortho  Percocet p.r.n. for pain    Ortho recs:   Order hinge knee brace and consult PT. Non-operative treatment at this time. Continue with current pain regimen.   Toe touch weight bearing with knee brace for at least 4 weeks, then re-evaluate with orthopedics.    - PT/OT - SNF recommended - not covered by insurance  - Patient unable to return to her senior living apartment as she is not ambulatory  - Sister unable to care for her in her home  - Could be a good inpatient rehab candidate - no reason why she could not recover with therapy and resume her previous living situation

## 2025-04-04 NOTE — PT/OT/SLP PROGRESS
Occupational Therapy   Treatment    Name: Anika Barajas  MRN: [id number]  Admitting Diagnosis:  Tibial plateau fracture       Recommendations:     Discharge Recommendations: High Intensity Therapy  Discharge Equipment Recommendations:  to be determined by next level of care, bedside commode, walker, rolling, hospital bed  Barriers to discharge:  Inaccessible home environment, Decreased caregiver support (Current functional level)    Assessment:     Anika Barajas is a 64 y.o. female with a medical diagnosis of Tibial plateau fracture.  She presents with the following performance deficits affecting function: weakness, impaired endurance, impaired self care skills, impaired functional mobility, gait instability, impaired balance, decreased safety awareness, decreased lower extremity function, edema, decreased coordination, orthopedic precautions, decreased ROM, impaired joint extensibility.     Patient was independent at home prior to this event for ADL, ADL mobility, and IADL. There is expectation of returning to prior level of function to maintain independence avoiding readmission.  Pt needing a total interdisciplinary treatment approach. Pt is at high risk of unplanned readmission due to fall risk and lack of 24 hour caregiver in prior setting.  Pt is able to tolerate 3 hours of daily therapy. Pt is motivated to return to prior level of function.       Rehab Prognosis:  Good; patient would benefit from acute skilled OT services to address these deficits and reach maximum level of function.       Plan:     Patient to be seen 5 x/week to address the above listed problems via self-care/home management, therapeutic activities, therapeutic exercises  Plan of Care Expires: 04/12/25  Plan of Care Reviewed with: patient    Subjective     Chief Complaint: None  Patient/Family Comments/goals: Pt stating she is ready to get out of the bed.   Pain/Comfort:  Pain Rating 1: 0/10  Pain Rating Post-Intervention 1:  0/10    Objective:     Patient found HOB elevated with peripheral IV, PureWick upon OT entry to room.    General Precautions: Standard, fall, seizure    Orthopedic Precautions:LLE toe touch weight bearing  Braces:  (Left HKB locked in extension)  Respiratory Status: Room air     Occupational Performance:     Bed Mobility:    Supine to sit: Supervision with HOB elevated     Functional Mobility/Transfers:  Sit to stand: CGA with RW  Bed to Chair transfer: CGA with RW, TTWB left LE  Functional Mobility: CGA with RW    Activities of Daily Living:  UB dressing: Set up for pullover shirt  LB dressing: Bed level: Mod A for readjusting HKB and fastening straps, Min A to don elastic waist shorts in long sitting then supine to pull up shorts (rolling and bridging with right LE); Chair level: Set up/SBA to don right ankle brace, Min A to don shoe over ankle brace  Education on type of clothing that would be easiest to manage while wearing HKB including elastic waist, wide shorts similar to basketball shorts, especially when needing to manage clothing during toileting while wearing HKB      [facility] 6 Click ADL: 16    Treatment & Education:  Role of OT, POC, ortho precautions, adaptive techniques for LB dressing, safety, ADL and ADL mobility training, discharge recs     Patient left up in chair with all lines intact and call button in reach    GOALS:   Multidisciplinary Problems       Occupational Therapy Goals          Problem: Occupational Therapy    Goal Priority Disciplines Outcome Interventions   Occupational Therapy Goal     OT, PT/OT Progressing    Description: Goals to be met by: 4/4/2025     Patient will increase functional independence with ADLs by performing:    UE Dressing with Supervision.  LE Dressing with Minimal Assistance.  Grooming while EOB with Set-up Assistance.  Toilet transfer to bedside commode with Contact Guard Assistance.                         DME Justifications:   Anika Bolton requires a commode for  home use because she is confined to a single room.   Anika Bolton's mobility limitation cannot be sufficiently resolved by the use of a cane. Her functional mobility deficit can be sufficiently resolved with the use of a Rolling Walker. Patient's mobility limitation significantly impairs their ability to participate in one of more activities of daily living.  The use of a RW will significantly improve the patient's ability to participate in MRADLS and the patient will use it on regular basis in the home.    Time Tracking:     OT Date of Treatment: 04/04/25  OT Start Time: 1039  OT Stop Time: 1123  OT Total Time (min): 44 min    Billable Minutes:Self Care/Home Management 44    OT/ADY: OT          4/4/2025

## 2025-04-05 LAB
TB INDURATION 48 - 72 HR READ: NORMAL
TB SKIN TEST 48 - 72 HR READ: NORMAL

## 2025-04-05 PROCEDURE — 25000003 PHARM REV CODE 250: Performed by: NURSE PRACTITIONER

## 2025-04-05 PROCEDURE — 63600175 PHARM REV CODE 636 W HCPCS: Performed by: HOSPITALIST

## 2025-04-05 PROCEDURE — 25000003 PHARM REV CODE 250: Performed by: STUDENT IN AN ORGANIZED HEALTH CARE EDUCATION/TRAINING PROGRAM

## 2025-04-05 PROCEDURE — 11000001 HC ACUTE MED/SURG PRIVATE ROOM

## 2025-04-05 PROCEDURE — 25000003 PHARM REV CODE 250: Performed by: HOSPITALIST

## 2025-04-05 RX ADMIN — ENOXAPARIN SODIUM 40 MG: 40 INJECTION SUBCUTANEOUS at 04:04

## 2025-04-05 RX ADMIN — OXYCODONE HYDROCHLORIDE AND ACETAMINOPHEN 1 TABLET: 7.5; 325 TABLET ORAL at 08:04

## 2025-04-05 RX ADMIN — LEVOTHYROXINE SODIUM 112 MCG: 112 TABLET ORAL at 05:04

## 2025-04-05 RX ADMIN — MELATONIN TAB 3 MG 6 MG: 3 TAB at 08:04

## 2025-04-05 RX ADMIN — QUETIAPINE FUMARATE 25 MG: 25 TABLET ORAL at 08:04

## 2025-04-05 RX ADMIN — SENNOSIDES AND DOCUSATE SODIUM 2 TABLET: 50; 8.6 TABLET ORAL at 08:04

## 2025-04-05 RX ADMIN — ATORVASTATIN CALCIUM 40 MG: 20 TABLET, FILM COATED ORAL at 08:04

## 2025-04-05 RX ADMIN — POLYETHYLENE GLYCOL 3350 17 G: 17 POWDER, FOR SOLUTION ORAL at 08:04

## 2025-04-05 RX ADMIN — LAMOTRIGINE 200 MG: 100 TABLET ORAL at 08:04

## 2025-04-05 RX ADMIN — QUETIAPINE FUMARATE 200 MG: 200 TABLET ORAL at 08:04

## 2025-04-05 NOTE — SUBJECTIVE & OBJECTIVE
Interval History: Up in a chair.  Complains of being awakened too often at night.  Otherwise nothing new.    Review of Systems   Constitutional:  Negative for chills and fever.   Respiratory:  Negative for cough and shortness of breath.    Cardiovascular:  Negative for chest pain and palpitations.   Musculoskeletal:  Positive for gait problem.        Left leg pain.     Psychiatric/Behavioral:  Positive for sleep disturbance.      Objective:     Vital Signs (Most Recent):  Temp: 98 °F (36.7 °C) (04/05/25 0710)  Pulse: (!) 50 (04/05/25 0710)  Resp: 18 (04/05/25 0710)  BP: 117/67 (04/05/25 0710)  SpO2: 97 % (04/05/25 0710) Vital Signs (24h Range):  Temp:  [97.7 °F (36.5 °C)-98.4 °F (36.9 °C)] 98 °F (36.7 °C)  Pulse:  [50-64] 50  Resp:  [16-18] 18  SpO2:  [93 %-98 %] 97 %  BP: ()/(50-67) 117/67     Weight: 84 kg (185 lb 3 oz)  Body mass index is 27.35 kg/m².    Intake/Output Summary (Last 24 hours) at 4/5/2025 1045  Last data filed at 4/5/2025 0946  Gross per 24 hour   Intake 420 ml   Output 2101 ml   Net -1681 ml         Physical Exam  Cardiovascular:      Rate and Rhythm: Normal rate and regular rhythm.      Heart sounds: Normal heart sounds. No murmur heard.     No gallop.   Pulmonary:      Effort: Pulmonary effort is normal.      Breath sounds: Normal breath sounds.   Abdominal:      General: Bowel sounds are normal.      Palpations: Abdomen is soft.   Musculoskeletal:      Comments: Left leg with hinged knee brace in place   Skin:     General: Skin is warm and dry.   Neurological:      General: No focal deficit present.      Mental Status: She is alert.   Psychiatric:         Mood and Affect: Mood normal.         Behavior: Behavior normal.      Comments: Cognitive impairment noted               Significant Labs: All pertinent labs within the past 24 hours have been reviewed.    Significant Imaging: I have reviewed all pertinent imaging results/findings within the past 24 hours.
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
## 2025-04-05 NOTE — PLAN OF CARE
Problem: Adult Inpatient Plan of Care  Goal: Plan of Care Review  4/5/2025 1745 by Kaela Nogueira, RN  Outcome: Progressing  4/5/2025 1744 by Kaela Nogueira, RN  Outcome: Progressing  Goal: Patient-Specific Goal (Individualized)  Outcome: Progressing  Goal: Absence of Hospital-Acquired Illness or Injury  Outcome: Progressing  Goal: Optimal Comfort and Wellbeing  Outcome: Progressing  Goal: Readiness for Transition of Care  Outcome: Progressing     Problem: Fall Injury Risk  Goal: Absence of Fall and Fall-Related Injury  Outcome: Progressing     Problem: Skin Injury Risk Increased  Goal: Skin Health and Integrity  Outcome: Progressing     Problem: Comorbidity Management  Goal: Maintenance of Seizure Control  Outcome: Progressing     Problem: Pain Acute  Goal: Optimal Pain Control and Function  Outcome: Progressing

## 2025-04-06 PROBLEM — G25.3 RESTLESS LEGS SYNDROME WITH NOCTURNAL MYOCLONUS: Status: ACTIVE | Noted: 2025-04-06

## 2025-04-06 PROBLEM — G40.909 SEIZURE DISORDER: Status: ACTIVE | Noted: 2025-03-19

## 2025-04-06 PROBLEM — G25.81 RESTLESS LEGS SYNDROME WITH NOCTURNAL MYOCLONUS: Status: ACTIVE | Noted: 2025-04-06

## 2025-04-06 PROCEDURE — 25000003 PHARM REV CODE 250: Performed by: STUDENT IN AN ORGANIZED HEALTH CARE EDUCATION/TRAINING PROGRAM

## 2025-04-06 PROCEDURE — 25000003 PHARM REV CODE 250: Performed by: HOSPITALIST

## 2025-04-06 PROCEDURE — 11000001 HC ACUTE MED/SURG PRIVATE ROOM

## 2025-04-06 PROCEDURE — 25000003 PHARM REV CODE 250: Performed by: NURSE PRACTITIONER

## 2025-04-06 PROCEDURE — 63600175 PHARM REV CODE 636 W HCPCS: Performed by: HOSPITALIST

## 2025-04-06 RX ORDER — ROPINIROLE 0.25 MG/1
0.25 TABLET, FILM COATED ORAL NIGHTLY
Status: DISCONTINUED | OUTPATIENT
Start: 2025-04-06 | End: 2025-04-07 | Stop reason: HOSPADM

## 2025-04-06 RX ORDER — ONDANSETRON 4 MG/1
4 TABLET, ORALLY DISINTEGRATING ORAL EVERY 8 HOURS PRN
Status: DISCONTINUED | OUTPATIENT
Start: 2025-04-06 | End: 2025-04-07 | Stop reason: HOSPADM

## 2025-04-06 RX ORDER — FAMOTIDINE 20 MG/1
20 TABLET, FILM COATED ORAL 2 TIMES DAILY PRN
Status: DISCONTINUED | OUTPATIENT
Start: 2025-04-06 | End: 2025-04-07 | Stop reason: HOSPADM

## 2025-04-06 RX ADMIN — POLYETHYLENE GLYCOL 3350 17 G: 17 POWDER, FOR SOLUTION ORAL at 08:04

## 2025-04-06 RX ADMIN — ENOXAPARIN SODIUM 40 MG: 40 INJECTION SUBCUTANEOUS at 04:04

## 2025-04-06 RX ADMIN — ATORVASTATIN CALCIUM 40 MG: 20 TABLET, FILM COATED ORAL at 08:04

## 2025-04-06 RX ADMIN — OXYCODONE HYDROCHLORIDE AND ACETAMINOPHEN 1 TABLET: 7.5; 325 TABLET ORAL at 08:04

## 2025-04-06 RX ADMIN — QUETIAPINE FUMARATE 200 MG: 200 TABLET ORAL at 09:04

## 2025-04-06 RX ADMIN — ONDANSETRON 4 MG: 4 TABLET, ORALLY DISINTEGRATING ORAL at 03:04

## 2025-04-06 RX ADMIN — ROPINIROLE HYDROCHLORIDE 0.25 MG: 0.25 TABLET, FILM COATED ORAL at 09:04

## 2025-04-06 RX ADMIN — QUETIAPINE FUMARATE 25 MG: 25 TABLET ORAL at 08:04

## 2025-04-06 RX ADMIN — FAMOTIDINE 20 MG: 20 TABLET, FILM COATED ORAL at 03:04

## 2025-04-06 RX ADMIN — LAMOTRIGINE 200 MG: 100 TABLET ORAL at 08:04

## 2025-04-06 RX ADMIN — MELATONIN TAB 3 MG 6 MG: 3 TAB at 09:04

## 2025-04-06 RX ADMIN — LEVOTHYROXINE SODIUM 112 MCG: 112 TABLET ORAL at 06:04

## 2025-04-06 RX ADMIN — SENNOSIDES AND DOCUSATE SODIUM 2 TABLET: 50; 8.6 TABLET ORAL at 08:04

## 2025-04-06 NOTE — PLAN OF CARE
Problem: Adult Inpatient Plan of Care  Goal: Plan of Care Review  Outcome: Not Progressing  Goal: Patient-Specific Goal (Individualized)  Outcome: Not Progressing  Goal: Absence of Hospital-Acquired Illness or Injury  Outcome: Not Progressing  Goal: Optimal Comfort and Wellbeing  Outcome: Not Progressing  Goal: Readiness for Transition of Care  Outcome: Not Progressing     Problem: Fall Injury Risk  Goal: Absence of Fall and Fall-Related Injury  Outcome: Not Progressing     Problem: Skin Injury Risk Increased  Goal: Skin Health and Integrity  Outcome: Not Progressing     Problem: Comorbidity Management  Goal: Maintenance of Seizure Control  Outcome: Not Progressing     Problem: Pain Acute  Goal: Optimal Pain Control and Function  Outcome: Not Progressing     Problem: Orthopaedic Fracture  Goal: Absence of Bleeding  Outcome: Not Progressing  Goal: Bowel Elimination  Outcome: Not Progressing  Goal: Absence of Embolism Signs and Symptoms  Outcome: Not Progressing  Goal: Fracture Stability  Outcome: Not Progressing  Goal: Optimal Functional Ability  Outcome: Not Progressing  Goal: Absence of Infection Signs and Symptoms  Outcome: Not Progressing  Goal: Effective Tissue Perfusion  Outcome: Not Progressing  Goal: Optimal Pain Control and Function  Outcome: Not Progressing  Goal: Effective Oxygenation and Ventilation  Outcome: Not Progressing

## 2025-04-06 NOTE — NURSING
"PT WITH C/O "INDIGESTION" AFTER EATING, 111/59 (MAP 77), HR 66,SATS 98% ON ROOM AIR, MESSAGE SENT TO MD  "

## 2025-04-06 NOTE — SUBJECTIVE & OBJECTIVE
Interval History: No new problems.  Says she is doing well with therapy.  IV is bothering her and she is getting nothing IV so asking if it can be removed.    Review of Systems   Constitutional:  Negative for chills and fever.   Respiratory:  Negative for cough and shortness of breath.    Cardiovascular:  Negative for chest pain and palpitations.   Musculoskeletal:  Positive for gait problem.        Left leg pain.     Psychiatric/Behavioral:  Positive for sleep disturbance.      Objective:     Vital Signs (Most Recent):  Temp: 97.6 °F (36.4 °C) (04/06/25 1146)  Pulse: 66 (04/06/25 1146)  Resp: 18 (04/06/25 1146)  BP: 102/62 (04/06/25 1230)  SpO2: (!) 94 % (04/06/25 1146) Vital Signs (24h Range):  Temp:  [97.5 °F (36.4 °C)-98.4 °F (36.9 °C)] 97.6 °F (36.4 °C)  Pulse:  [53-67] 66  Resp:  [18] 18  SpO2:  [94 %-99 %] 94 %  BP: ()/(46-65) 102/62     Weight: 84 kg (185 lb 3 oz)  Body mass index is 27.35 kg/m².    Intake/Output Summary (Last 24 hours) at 4/6/2025 1433  Last data filed at 4/6/2025 1146  Gross per 24 hour   Intake 1920 ml   Output 3101 ml   Net -1181 ml         Physical Exam  Cardiovascular:      Rate and Rhythm: Normal rate and regular rhythm.      Heart sounds: Normal heart sounds. No murmur heard.     No gallop.   Pulmonary:      Effort: Pulmonary effort is normal.      Breath sounds: Normal breath sounds.   Abdominal:      General: Bowel sounds are normal.      Palpations: Abdomen is soft.   Musculoskeletal:      Comments: Left leg with hinged knee brace in place   Skin:     General: Skin is warm and dry.   Neurological:      General: No focal deficit present.      Mental Status: She is alert.   Psychiatric:         Mood and Affect: Mood normal.         Behavior: Behavior normal.      Comments: Cognitive impairment noted               Significant Labs: All pertinent labs within the past 24 hours have been reviewed.    Significant Imaging: I have reviewed all pertinent imaging results/findings  within the past 24 hours.

## 2025-04-06 NOTE — ASSESSMENT & PLAN NOTE
"Continue Lamictal  Started low dose Requip at night 0.25 mg - seems quite helpful for her symptoms of legs "jumping" at night - will continue current dose, does not seem to require an increase.      "
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
## 2025-04-07 VITALS
SYSTOLIC BLOOD PRESSURE: 101 MMHG | TEMPERATURE: 98 F | BODY MASS INDEX: 27.43 KG/M2 | RESPIRATION RATE: 18 BRPM | HEIGHT: 69 IN | OXYGEN SATURATION: 98 % | DIASTOLIC BLOOD PRESSURE: 55 MMHG | HEART RATE: 59 BPM | WEIGHT: 185.19 LBS

## 2025-04-07 PROCEDURE — 25000003 PHARM REV CODE 250: Performed by: STUDENT IN AN ORGANIZED HEALTH CARE EDUCATION/TRAINING PROGRAM

## 2025-04-07 PROCEDURE — 97542 WHEELCHAIR MNGMENT TRAINING: CPT | Mod: CQ

## 2025-04-07 PROCEDURE — 97535 SELF CARE MNGMENT TRAINING: CPT

## 2025-04-07 PROCEDURE — 25000003 PHARM REV CODE 250: Performed by: HOSPITALIST

## 2025-04-07 PROCEDURE — 25000003 PHARM REV CODE 250: Performed by: NURSE PRACTITIONER

## 2025-04-07 RX ORDER — ROPINIROLE 0.25 MG/1
0.25 TABLET, FILM COATED ORAL NIGHTLY
Start: 2025-04-07 | End: 2026-04-07

## 2025-04-07 RX ADMIN — OXYCODONE HYDROCHLORIDE AND ACETAMINOPHEN 1 TABLET: 7.5; 325 TABLET ORAL at 10:04

## 2025-04-07 RX ADMIN — QUETIAPINE FUMARATE 25 MG: 25 TABLET ORAL at 08:04

## 2025-04-07 RX ADMIN — SENNOSIDES AND DOCUSATE SODIUM 2 TABLET: 50; 8.6 TABLET ORAL at 08:04

## 2025-04-07 RX ADMIN — LAMOTRIGINE 200 MG: 100 TABLET ORAL at 08:04

## 2025-04-07 RX ADMIN — ATORVASTATIN CALCIUM 40 MG: 20 TABLET, FILM COATED ORAL at 08:04

## 2025-04-07 RX ADMIN — LEVOTHYROXINE SODIUM 112 MCG: 112 TABLET ORAL at 05:04

## 2025-04-07 RX ADMIN — POLYETHYLENE GLYCOL 3350 17 G: 17 POWDER, FOR SOLUTION ORAL at 08:04

## 2025-04-07 NOTE — PT/OT/SLP PROGRESS
"Physical Therapy Treatment    Patient Name:  Anika Barajas   MRN:  [id number]    Recommendations:     Discharge Recommendations: High Intensity Therapy  Discharge Equipment Recommendations: to be determined by next level of care, walker, rolling, wheelchair, bedside commode (20" WC with L elevating leg rest, cushion, and anti-tippers)  Barriers to discharge: Inaccessible home and Decreased caregiver support    Assessment:     Anika Barajas is a 64 y.o. female admitted with a medical diagnosis of Tibial plateau fracture.  She presents with the following impairments/functional limitations: weakness, gait instability, edema, orthopedic precautions, impaired balance, impaired functional mobility, impaired joint extensibility, impaired self care skills, decreased coordination, decreased lower extremity function, decreased ROM, decreased safety awareness.    Supine>sit with mod(I)  Sit>stand (from EOB, WC) with RW and Hilario  Bed>WC with RW and Hilario, stand/pivot, RLE TTWB  WC>Chair with RW and Hilario, stand/pivot, RLE TTWB  Propelled WC x 300' with BUE and SPV, pt making turns and demonstrating correct use of brakes  Rec High Intensity Therapy    Rehab Prognosis: Good; patient would benefit from acute skilled PT services to address these deficits and reach maximum level of function.    Recent Surgery: * No surgery found *      Plan:     During this hospitalization, patient to be seen 5 x/week to address the identified rehab impairments via gait training, therapeutic activities, therapeutic exercises, neuromuscular re-education, wheelchair management/training and progress toward the following goals:    Plan of Care Expires:  04/18/25    Subjective     Chief Complaint: none stated  Patient/Family Comments/goals: pt agreeable to take a spin in the WC  Pain/Comfort:  Pain Rating 1: 0/10  Pain Rating Post-Intervention 1: 0/10      Objective:     Communicated with nurse Dickson prior to session.  Patient found HOB " elevated with peripheral IV, PureWick upon PT entry to room.     General Precautions: Standard, fall, seizure  Orthopedic Precautions: LLE toe touch weight bearing  Braces: Hinged knee brace  Respiratory Status: Room air     Functional Mobility:  Bed Mobility:     Supine to Sit: modified independence  Transfers:     Sit to Stand:  minimum assistance with rolling walker  Bed to Chair: minimum assistance with  rolling walker  using  Stand Pivot  Wheelchair Propulsion:  Pt propelled Standard wheelchair x 300 feet on Level tile with  Bilateral upper extremity with Supervision or Set-up Assistance.       AM-PAC 6 CLICK MOBILITY  Turning over in bed (including adjusting bedclothes, sheets and blankets)?: 3  Sitting down on and standing up from a chair with arms (e.g., wheelchair, bedside commode, etc.): 3  Moving from lying on back to sitting on the side of the bed?: 4  Moving to and from a bed to a chair (including a wheelchair)?: 3  Need to walk in hospital room?: 2  Climbing 3-5 steps with a railing?: 1  Basic Mobility Total Score: 16       Treatment & Education:  Transfers and WC mobility as noted    Patient left up in chair with all lines intact, call button in reach, and sister present..    GOALS:   Multidisciplinary Problems       Physical Therapy Goals          Problem: Physical Therapy    Goal Priority Disciplines Outcome Interventions   Physical Therapy Goal     PT, PT/OT Progressing    Description: Goals to be met by: 25    Patient will increase functional independence with mobility by performin. Supine<>sit with indep without use of HB features  2. Sit<>stand with mod(I) and RW.   3. Transfer bed<>WC with mod(I) and LRAD with most appropriate technique.  4. Gait  x 10 feet with RW and supervision maintaining TTWB of LLE.  5. Propel WC x300 ft with supervision including 180 deg turns and applying brakes without cueing.                        DME Justifications:   Anika Bolton requires a commode for  home use because she is confined to a single room.  Anika Barajas has a mobility limitation that significantly impairs her ability to participate in one or more mobility related activities of daily living (MRADLs) such as toileting, feeding, dressing, grooming, and bathing in customary locations in the home.  The mobility limitation cannot be sufficiently resolved by the use of a cane or walker.   The use of a manual wheelchair will significantly improve the patients ability to participate in MRADLS and the patient will use it on regular basis in the home.  Anika Barajas has expressed her willingness to use a manual wheelchair in the home. Patients upper body strength is sufficient for propulsion.  She also has a caregiver who is available, willing, and able to provide assistance with the wheelchair when needed.     Anika Bolton's mobility limitation cannot be sufficiently resolved by the use of a cane. Her functional mobility deficit can be sufficiently resolved with the use of a Rolling Walker. Patient's mobility limitation significantly impairs their ability to participate in one of more activities of daily living.  The use of a RW will significantly improve the patient's ability to participate in MRADLS and the patient will use it on regular basis in the home.    Time Tracking:     PT Received On: 04/04/25  PT Start Time: 1130     PT Stop Time: 1148  PT Total Time (min): 18 min     Billable Minutes: Train/Wheelchair Management 18    Treatment Type: Treatment  PT/PTA: PTA     Number of PTA visits since last PT visit: 3     04/07/2025

## 2025-04-07 NOTE — NURSING
Per handoff received from Pauline CARUSO RN. Patient care assumed. Patients overall condition assessed and patient appears to be in NAD with no complaints of pain. Patient with no noted PIV in place. Purewick in place. Call light in reach and patient instructed to inform the nurse if anything is needed. Patient stable and is continually being monitored.

## 2025-04-07 NOTE — PLAN OF CARE
Case Management Final Discharge Note    Discharge Disposition: [facility] ([facility])    New DME ordered / company name: none    Relevant SDOH / Transition of Care Barriers:  none    Person available to provide assistance at home when needed and their contact information: Esther Curry (sister) [phone]    Scheduled followup appointment: N/A    Referrals placed: none    Transportation: Patient will transport via ambulance.    Patient and sister, Esther aware of transfer to [facility].     Patient and family educated on discharge services and updated on DC plan. Bedside RN notified, patient clear to discharge from Case Management Perspective.       [facility] ([place])  Discharge Final Note    Primary Care Provider: Mariia Primary Doctor    Expected Discharge Date: 4/7/2025    Final Discharge Note (most recent)       Final Note - 04/07/25 1654          Final Note    Assessment Type Final Discharge Note     Anticipated Discharge Disposition penitentiary Nursing Facility     Hospital Resources/Appts/Education Provided Provided patient/caregiver with written discharge plan information        Post-Acute Status    Post-Acute Authorization Placement     Post-Acute Placement Status Set-up Complete/Auth obtained     Discharge Delays None known at this time                     Important Message from Medicare  Important Message from Medicare regarding Discharge Appeal Rights: Explained to patient/caregiver, Signed/date by patient/caregiver     Date IMM was signed: 04/01/25  Time IMM was signed: 1107    Contact Info       [facility]   Specialty: Orthopedic Surgery    [facility] [address]  [facility] [address]   Phone: [phone]       Next Steps: Follow up    Instructions: Follow up with knee specialist by 4/17/2025

## 2025-04-07 NOTE — PLAN OF CARE
04/07/25 0859   Discharge Reassessment   Assessment Type Discharge Planning Reassessment   Did the patient's condition or plan change since previous assessment? No   Discharge Plan discussed with: Patient   Communicated PARMJIT with patient/caregiver Date not available/Unable to determine   Discharge Plan A New Nursing Home placement - correction care facility   Discharge Plan B Home Health;Home   DME Needed Upon Discharge  none   Transition of Care Barriers None   Why the patient remains in the hospital Placement issues;Insurance issues   Post-Acute Status   Post-Acute Authorization Placement   Post-Acute Placement Status Pending payor review/awaiting authorization (if required)   Discharge Delays (!) Post-Acute Set-up     SW spoke with Joan in admissions at [facility], patient's payee sent financials over the weekend and Joan sent to the business office for review. SW to follow up.
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
## 2025-04-07 NOTE — SUBJECTIVE & OBJECTIVE
"Interval History: She's about the same.  Says "I can only do toe touch."  Awaiting state payor source to communicate with Sidney Duggan.    Review of Systems   Constitutional:  Negative for chills and fever.   Respiratory:  Negative for cough and shortness of breath.    Cardiovascular:  Negative for chest pain and palpitations.   Musculoskeletal:  Positive for gait problem.        Left leg pain.       Objective:     Vital Signs (Most Recent):  Temp: 97.9 °F (36.6 °C) (04/07/25 0742)  Pulse: (!) 59 (04/07/25 0742)  Resp: 18 (04/07/25 1028)  BP: 124/66 (04/07/25 0742)  SpO2: 97 % (04/07/25 0742) Vital Signs (24h Range):  Temp:  [97.6 °F (36.4 °C)-98.5 °F (36.9 °C)] 97.9 °F (36.6 °C)  Pulse:  [59-69] 59  Resp:  [17-18] 18  SpO2:  [93 %-98 %] 97 %  BP: ()/(49-66) 124/66     Weight: 84 kg (185 lb 3 oz)  Body mass index is 27.35 kg/m².    Intake/Output Summary (Last 24 hours) at 4/7/2025 1126  Last data filed at 4/7/2025 0917  Gross per 24 hour   Intake 1257 ml   Output 2551 ml   Net -1294 ml         Physical Exam  Cardiovascular:      Rate and Rhythm: Normal rate and regular rhythm.      Heart sounds: Normal heart sounds. No murmur heard.     No gallop.   Pulmonary:      Effort: Pulmonary effort is normal.      Breath sounds: Normal breath sounds.   Abdominal:      General: Bowel sounds are normal.      Palpations: Abdomen is soft.   Musculoskeletal:      Comments: Left leg with hinged knee brace in place   Skin:     General: Skin is warm and dry.   Neurological:      General: No focal deficit present.      Mental Status: She is alert.   Psychiatric:         Mood and Affect: Mood normal.         Behavior: Behavior normal.      Comments: Cognitive impairment noted               Significant Labs: All pertinent labs within the past 24 hours have been reviewed.    Significant Imaging: I have reviewed all pertinent imaging results/findings within the past 24 hours.  "

## 2025-04-07 NOTE — NURSING
Report called to BRADY Parada at [facility] verbalized understanding awaiting transport.      [facility] in facility at this time for pt transport. AVS given to EMT.  Pt discharged left via stretcher x 2 EMTS with belongings.

## 2025-04-07 NOTE — CODE/ RAPID DOCUMENTATION
"RAPID RESPONSE NURSE CHART REVIEW        Chart Reviewed: 04/07/2025, 12:30 AM    MRN: [id number]  Bed: B309/B309 A    Dx: Tibial plateau fracture    Anika Barajas has a past medical history of Seizures and Thyroid disease.    Last VS: BP (!) 98/56 (BP Location: Left arm, Patient Position: Lying)   Pulse 67   Temp 98 °F (36.7 °C) (Oral)   Resp 17   Ht 5' 9" (1.753 m)   Wt 84 kg (185 lb 3 oz)   SpO2 97%   Breastfeeding No   BMI 27.35 kg/m²     24H Vital Sign Range:  Temp:  [97.5 °F (36.4 °C)-98.5 °F (36.9 °C)]   Pulse:  [53-69]   Resp:  [17-18]   BP: ()/(49-65)   SpO2:  [93 %-99 %]     Level of Consciousness (AVPU): alert    No results for input(s): "CBC", "WBC", "HGB", "HCT", "PLT" in the last 72 hours.    No results for input(s): "NA", "K", "CL", "CO2", "BUN", "CREATININE", "GLU", "PHOS", "MG" in the last 72 hours.    Invalid input(s): "CMP", "TBIL"     No results for input(s): "PH", "PCO2", "PO2", "HCO3", "POCSATURATED", "BE" in the last 72 hours.     OXYGEN:  Flow (L/min) (Oxygen Therapy): 1          MEWS score: 1    Bedside nurse, Rama KING      Noted pts low BP and requested a recheck. Familiar with pt and her tendency to have softer BP during the night.     Rechecked BP @ normal range     Instructed to call [phone] for further concerns or assistance.    Víctor Beaulieu RN       "

## 2025-04-07 NOTE — PLAN OF CARE
Problem: Adult Inpatient Plan of Care  Goal: Plan of Care Review  Flowsheets (Taken 4/7/2025 0143)  Plan of Care Reviewed With: patient  Goal: Absence of Hospital-Acquired Illness or Injury  Intervention: Identify and Manage Fall Risk  Flowsheets (Taken 4/7/2025 0143)  Safety Promotion/Fall Prevention:   bed alarm set   Fall Risk reviewed with patient/family   lighting adjusted   medications reviewed   supervised activity  Intervention: Prevent Skin Injury  Flowsheets (Taken 4/7/2025 0143)  Body Position: position changed independently  Intervention: Prevent and Manage VTE (Venous Thromboembolism) Risk  Flowsheets (Taken 4/7/2025 0143)  VTE Prevention/Management: ROM (active) performed     Problem: Pain Acute  Goal: Optimal Pain Control and Function  Intervention: Develop Pain Management Plan  Flowsheets (Taken 4/7/2025 0143)  Pain Management Interventions:   relaxation techniques promoted   pain management plan reviewed with patient/caregiver  Intervention: Prevent or Manage Pain  Flowsheets (Taken 4/7/2025 0143)  Sleep/Rest Enhancement: regular sleep/rest pattern promoted  Medication Review/Management: medications reviewed     Problem: Orthopaedic Fracture  Goal: Optimal Functional Ability  Intervention: Optimize Functional Ability  Flowsheets (Taken 4/7/2025 0143)  Self-Care Promotion:   BADL personal objects within reach   independence encouraged  Positioning/Transfer Devices: immobilization device

## 2025-04-08 NOTE — PT/OT/SLP PROGRESS
Occupational Therapy   Treatment    Name: Anika Barajas  MRN: [id number]  Admitting Diagnosis:  Tibial plateau fracture       Recommendations:     Discharge Recommendations: High Intensity Therapy  Discharge Equipment Recommendations:  to be determined by next level of care  Barriers to discharge:  Inaccessible home environment, Decreased caregiver support (current functional level)    Assessment:     Anika Barajas is a 64 y.o. female with a medical diagnosis of Tibial plateau fracture.  She presents with the following performance deficits affecting function: weakness, impaired endurance, impaired self care skills, impaired functional mobility, decreased coordination, impaired balance, gait instability, decreased lower extremity function, decreased ROM, impaired cardiopulmonary response to activity, impaired joint extensibility, orthopedic precautions, decreased safety awareness, edema.     Rehab Prognosis:  Good; patient would benefit from acute skilled OT services to address these deficits and reach maximum level of function.       Plan:     Patient to be seen 5 x/week to address the above listed problems via self-care/home management, therapeutic activities, therapeutic exercises  Plan of Care Expires: 04/12/25  Plan of Care Reviewed with: patient    Subjective     Chief Complaint:  None  Patient/Family Comments/goals: Pt agreeable to OT tx session.  Pain/Comfort:  Pain Rating 1: 0/10    Objective:     Communicated with: nurse prior to session.  Patient found up in chair with peripheral IV, PureWick upon OT entry to room.    General Precautions: Standard, fall, seizure    Orthopedic Precautions:LLE toe touch weight bearing  Braces: Hinged knee brace  Respiratory Status: Room air     Occupational Performance:     Functional Mobility/Transfers:  Sit to stand: CGA with RW  Transfers CGA with RW  Functional Mobility: CGA with RW    Activities of Daily Living:  LB Dressing: Donning elastic waist pants -  Min A, Donning right ankle brace - SBA in long sitting, Donning right shoe - Mod A  Toileting: Min A for hygiene   UB Dressing: Set up/SBA seated in chair  Sponge bathing: Mod A       [facility] 6 Click ADL: 16    Treatment & Education:  Role of OT, POC, ortho precautions, ADL and ADL mobility training, safety, correct fit/donning of HKB, discharge recs    Patient left  seated on BSC over toilet  with all lines intact, nurse notified, and call button pull in pt's reach to notify nursing staff when she is finished in bathroom.    GOALS:   Multidisciplinary Problems       Occupational Therapy Goals          Problem: Occupational Therapy    Goal Priority Disciplines Outcome Interventions   Occupational Therapy Goal     OT, PT/OT Progressing    Description: Goals to be met by: 4/4/2025     Patient will increase functional independence with ADLs by performing:    UE Dressing with Supervision.  LE Dressing with Minimal Assistance.  Grooming while EOB with Set-up Assistance.  Toilet transfer to bedside commode with Contact Guard Assistance.                         Time Tracking:     OT Date of Treatment: 04/07/25  OT Start Time: 1648  OT Stop Time: 1727  OT Total Time (min): 39 min    Billable Minutes:Self Care/Home Management 39    OT/ADY: OT          4/7/2025